# Patient Record
Sex: FEMALE | Race: WHITE | NOT HISPANIC OR LATINO | Employment: OTHER | URBAN - METROPOLITAN AREA
[De-identification: names, ages, dates, MRNs, and addresses within clinical notes are randomized per-mention and may not be internally consistent; named-entity substitution may affect disease eponyms.]

---

## 2017-01-23 ENCOUNTER — OFFICE VISIT (OUTPATIENT)
Dept: URGENT CARE | Facility: URGENT CARE | Age: 72
End: 2017-01-23
Payer: COMMERCIAL

## 2017-01-23 ENCOUNTER — RADIANT APPOINTMENT (OUTPATIENT)
Dept: GENERAL RADIOLOGY | Facility: CLINIC | Age: 72
End: 2017-01-23
Attending: PHYSICIAN ASSISTANT
Payer: COMMERCIAL

## 2017-01-23 VITALS
DIASTOLIC BLOOD PRESSURE: 70 MMHG | SYSTOLIC BLOOD PRESSURE: 130 MMHG | BODY MASS INDEX: 28.07 KG/M2 | WEIGHT: 161 LBS | OXYGEN SATURATION: 97 % | HEART RATE: 70 BPM | TEMPERATURE: 97.5 F

## 2017-01-23 DIAGNOSIS — R05.9 COUGH: ICD-10-CM

## 2017-01-23 DIAGNOSIS — J06.9 ACUTE URI: Primary | ICD-10-CM

## 2017-01-23 PROCEDURE — 99213 OFFICE O/P EST LOW 20 MIN: CPT | Performed by: PHYSICIAN ASSISTANT

## 2017-01-23 PROCEDURE — 71020 XR CHEST 2 VW: CPT

## 2017-01-23 RX ORDER — BENZONATATE 100 MG/1
100 CAPSULE ORAL 3 TIMES DAILY PRN
Qty: 16 CAPSULE | Refills: 0 | Status: SHIPPED | OUTPATIENT
Start: 2017-01-23 | End: 2017-02-28

## 2017-01-23 ASSESSMENT — ENCOUNTER SYMPTOMS
FEVER: 0
DIARRHEA: 0
COUGH: 1
ABDOMINAL PAIN: 0
HEADACHES: 0
CHILLS: 0
SHORTNESS OF BREATH: 0
FOCAL WEAKNESS: 0
NAUSEA: 0
VOMITING: 0

## 2017-01-23 NOTE — NURSING NOTE
"Chief Complaint   Patient presents with     Cough       Initial /70 mmHg  Pulse 70  Temp(Src) 97.5  F (36.4  C) (Oral)  Wt 161 lb (73.029 kg)  SpO2 97% Estimated body mass index is 28.07 kg/(m^2) as calculated from the following:    Height as of 1/15/16: 5' 3.5\" (1.613 m).    Weight as of this encounter: 161 lb (73.029 kg).  BP completed using cuff size: regular    "

## 2017-01-23 NOTE — MR AVS SNAPSHOT
After Visit Summary   1/23/2017    Loly Oliveira    MRN: [id number]           Patient Information     Date Of Birth          1945        Visit Information        Provider Department      1/23/2017 1:45 PM Olinda Galvan PA-C [facility]        Today's Diagnoses     Acute URI    -  1     Cough            Follow-ups after your visit        Follow-up notes from your care team     Return if symptoms worsen or fail to improve.      Who to contact     If you have questions or need follow up information about today's clinic visit or your schedule please contact [facility] directly at [phone].  Normal or non-critical lab and imaging results will be communicated to you by MyChart, letter or phone within 4 business days after the clinic has received the results. If you do not hear from us within 7 days, please contact the clinic through DonorProhart or phone. If you have a critical or abnormal lab result, we will notify you by phone as soon as possible.  Submit refill requests through Veduca or call your pharmacy and they will forward the refill request to us. Please allow 3 business days for your refill to be completed.          Additional Information About Your Visit        MyChart Information     Veduca gives you secure access to your electronic health record. If you see a primary care provider, you can also send messages to your care team and make appointments. If you have questions, please call your primary care clinic.  If you do not have a primary care provider, please call [phone] and they will assist you.        Care EveryWhere ID     This is your Care EveryWhere ID. This could be used by other organizations to access your [facility] medical records  [id number]        Your Vitals Were     Pulse Temperature Pulse Oximetry             70 97.5  F (36.4  C) (Oral) 97%          Blood Pressure from Last 3 Encounters:    01/23/17 130/70   01/15/16 104/60   11/19/15 130/78    Weight from Last 3 Encounters:   01/23/17 161 lb (73.029 kg)   01/15/16 159 lb (72.122 kg)   11/19/15 159 lb (72.122 kg)              We Performed the Following     XR Chest 2 Views          Today's Medication Changes          These changes are accurate as of: 1/23/17  3:07 PM.  If you have any questions, ask your nurse or doctor.               Start taking these medicines.        Dose/Directions    benzonatate 100 MG capsule   Commonly known as:  TESSALON   Used for:  Acute URI   Started by:  Olinda Galvan PA-C        Dose:  100 mg   Take 1 capsule (100 mg) by mouth 3 times daily as needed for cough   Quantity:  16 capsule   Refills:  0            Where to get your medicines      These medications were sent to [address] [address]     Phone:  [phone]    - benzonatate 100 MG capsule             Primary Care Provider Office Phone # Fax #    Pradip Freeman [phone]       [address] [address]        Thank you!     Thank you for choosing [facility]  for your care. Our goal is always to provide you with excellent care. Hearing back from our patients is one way we can continue to improve our services. Please take a few minutes to complete the written survey that you may receive in the mail after your visit with us. Thank you!             Your Updated Medication List - Protect others around you: Learn how to safely use, store and throw away your medicines at www.disposemymeds.org.          This list is accurate as of: 1/23/17  3:07 PM.  Always use your most recent med list.                   Brand Name Dispense Instructions for use    benzonatate 100 MG capsule    TESSALON    16 capsule    Take 1 capsule (100 mg) by mouth 3 times daily as needed for cough       carbidopa-levodopa   MG per tablet    SINEMET    90 tablet    Take 1 tablet by mouth. Daily at bedtime       eflornithine HCl 13.9 % Crea    VANIQA    60 g    Apply to affected skin twice a day       memantine 10 MG tablet    NAMENDA    180 tablet    Take 1 tablet (10 mg) by mouth 2 times daily

## 2017-01-23 NOTE — PROGRESS NOTES
January 23, 2017    HPI: Loly Oliveira is a 71 year old female who complains of moderate cough & nasal congestion onset 1 week ago. Symptoms are constant in duration. She has tried OTC cough suppressants without relief. Denies fever/chills, sore throat, ear pain, hemoptysis, HA, CP, SOB, abd pain, N/V/D, rash, or any other symptoms. Patient denies sick contacts, tobacco use, or hx lung disease.    Past Medical History   Diagnosis Date     Diverticulitis of colon (without mention of hemorrhage)      RESTLESS LEGS SYNDROME      Restless Legs Syndrome     TINNITUS       OSTEOPENIA      Rosacea      Chondromalacia of patella      Esophageal reflux      Bell's palsy      Raynaud disease      Trochanteric bursitis 9/09     right     Dementia without behavioral disturbance, unspecified dementia type 11/19/2015     Past Surgical History   Procedure Laterality Date     C nonspecific procedure  1999     right arthroscopic surgery     C nonspecific procedure  1974     right shoulder decompression     C anter vesicourethropexy,simple  1986     Tonsillectomy & adenoidectomy  1947     C anter vesicourethropexy,simple  1989     bergeron     Hernia repair, umbilical  1989     Hc dilation/curettage diag/ther non ob  1970     Hc repair rotator cuff,chronic       C total abdom hysterectomy  1986     Salpingo oophorectomy,r/l/yara  1986     C nonspecific procedure  Feb 2010     Closed manipulation, left shoulder     Social History   Substance Use Topics     Smoking status: Never Smoker      Smokeless tobacco: Never Used     Alcohol Use: Yes      Comment: 1-2 times per year       Problem list, Medication list, Allergies, and Medical/Social/Surgical histories reviewed in [facility] and updated as appropriate.  HPI      Review of Systems   Constitutional: Negative for fever and chills.   HENT: Positive for congestion.    Respiratory: Positive for cough. Negative for shortness of breath.    Cardiovascular: Negative for chest pain.    Gastrointestinal: Negative for nausea, vomiting, abdominal pain and diarrhea.   Skin: Negative for rash.   Neurological: Negative for focal weakness and headaches.   All other systems reviewed and are negative.        Physical Exam   Constitutional: She is oriented to person, place, and time and well-developed, well-nourished, and in no distress.   HENT:   Head: Normocephalic and atraumatic.   Right Ear: Tympanic membrane, external ear and ear canal normal.   Left Ear: Tympanic membrane, external ear and ear canal normal.   Nose: Rhinorrhea present.   Mouth/Throat: Uvula is midline, oropharynx is clear and moist and mucous membranes are normal.   Cardiovascular: Normal rate, regular rhythm and normal heart sounds.    Pulmonary/Chest: Effort normal and breath sounds normal.   Musculoskeletal: Normal range of motion.   Neurological: She is alert and oriented to person, place, and time. Gait normal.   Skin: Skin is warm and dry.   Nursing note and vitals reviewed.      Vital Signs  /70 mmHg  Pulse 70  Temp(Src) 97.5  F (36.4  C) (Oral)  Wt 161 lb (73.029 kg)  SpO2 97%     Diagnostic Test Results:  CXR - negative    ASSESSMENT/PLAN      ICD-10-CM    1. Acute URI J06.9 benzonatate (TESSALON) 100 MG capsule   2. Cough R05 XR Chest 2 Views    Lungs CTAB, afebrile, NAD. CXR negative. Suspect viral URI, will Rx Tessalon perles.        I have discussed any lab or imaging results, the patient's diagnosis, and my plan of treatment with the patient and/or family. Patient is aware to come back in if with worsening symptoms or if no relief despite treatment plan.  Patient voiced understanding and had no further questions.       Follow Up: Return if symptoms worsen or fail to improve.    KAYLEY Escudero, PAHawkC  [facility]

## 2017-02-28 ENCOUNTER — OFFICE VISIT (OUTPATIENT)
Dept: INTERNAL MEDICINE | Facility: CLINIC | Age: 72
End: 2017-02-28
Payer: COMMERCIAL

## 2017-02-28 VITALS
HEIGHT: 64 IN | HEART RATE: 69 BPM | SYSTOLIC BLOOD PRESSURE: 128 MMHG | BODY MASS INDEX: 27.14 KG/M2 | TEMPERATURE: 97.8 F | OXYGEN SATURATION: 97 % | WEIGHT: 159 LBS | DIASTOLIC BLOOD PRESSURE: 72 MMHG

## 2017-02-28 DIAGNOSIS — F03.90 DEMENTIA WITHOUT BEHAVIORAL DISTURBANCE, UNSPECIFIED DEMENTIA TYPE: Primary | ICD-10-CM

## 2017-02-28 PROCEDURE — 99214 OFFICE O/P EST MOD 30 MIN: CPT | Performed by: INTERNAL MEDICINE

## 2017-02-28 RX ORDER — RIVASTIGMINE TARTRATE 1.5 MG/1
1.5 CAPSULE ORAL 2 TIMES DAILY
Qty: 60 CAPSULE | Refills: 1 | Status: SHIPPED | OUTPATIENT
Start: 2017-02-28 | End: 2017-03-20

## 2017-02-28 NOTE — PATIENT INSTRUCTIONS
Rivastigmine (Exelon) 1.5mg capsule, 1 capsule twice a day for memory  If feeling OK with med after 2 weeks, then increase to 2 capsules twice a day and call nurseline [phone] or email me in Flashtalkingt 4 days after increasing the dose. If tolerated OK, will then change to higher dose capsule. Eventual goal titration to 6mg  Twice a day  Continue Namenda (Memantine)  Walking or other exercise daily as able

## 2017-02-28 NOTE — PROGRESS NOTES
SUBJECTIVE:                                                    Loly Oliveira is a 71 year old female who presents to clinic today for the following health issues:      Dementia follow up  Six Item Cognitive Impairment Test   (6CIT):      What year is it?                               Correct - 0 points    What month is it?                               Correct - 0 points      Give the patient an address to remember with five components:   [place] ( first and last name - 2 components)   [address]  (number and name of street - 2 components)   [place] ( city - 1 component)      About what time is it (within the hour)? Incorrect - 3 points    Count backwards from 20 to 1:   More than one error - 4 points    Say the months of the year in reverse: Correct - 0 points    Repeat the address phrase:   4 errors - 8 points    Total 6CIT Score:      15/28    Interpretation: The 6CIT uses an inverse score and questions are weighted to produce a total out of 28. Scores of 0-7 are considered normal and 8 or more significant.    Advantages The test has high sensitivity without compromising specificity even in mild dementia. It is easy to translate linguistically and culturally.  Disadvantages The main disadvantage is in the scoring and weighting of the test, which is initially confusing, however computer models have simplified this greatly.    Probability Statistics: At the 7/8 cut off: Overall figures sensitivity 90% specificity 100%, in mild dementia sensitivity = 78% , specificity = 100%    Copyright 2000 [facility]. Courtesy of Dr. Eric Medrano    Pt's past medical history, family history, habits, medications and allergies were reviewed with the patient today.  See snap shot for  HCM status. Most recent lab results reviewed with pt. Problem list and histories reviewed & adjusted, as indicated.  Additional history as below:    Denies chest pain, shortness of breath, abdominal  "pain, headache, vision changes or side effects with medications. Memory worsened. Patient seen with her .  does the driving and states the patient does not drive unless an emergency situation and then orally on city streets. Patient is doing the cooking. No episodes of stove  being left on or other safety concerns per  prior workup for reversible causes of dementia normal. Patient denies depression symptoms. Presenting had a good response memory wise to initiation of Aricept but developed diarrhea side effects with the medication so it had to be stopped. Currently on Namenda.      Additional ROS:   Constitutional, HEENT, Cardiovascular, Pulmonary, GI and , Neuro, MSK and Psych review of systems/symptoms are otherwise negative or unchanged from previous, except as noted above.      OBJECTIVE:  /72  Pulse 69  Temp 97.8  F (36.6  C) (Oral)  Ht 5' 3.5\" (1.613 m)  Wt 159 lb (72.1 kg)  SpO2 97%  Breastfeeding? No  BMI 27.72 kg/m2   Estimated body mass index is 27.72 kg/(m^2) as calculated from the following:    Height as of this encounter: 5' 3.5\" (1.613 m).    Weight as of this encounter: 159 lb (72.1 kg).  Eye: PERRL, EOMI  HENT: ear canals and TM's normal and nose and mouth without ulcers or lesions   Neck: no adenopathy. Thyroid normal to palpation. No bruits  Pulm: Lungs clear to auscultation   CV: Regular rates and rhythm  GI: Soft, nontender, Normal active bowel sounds, No hepatosplenomegaly or masses palpable  Ext: Peripheral pulses intact. No edema.  Neuro: Normal strength and tone, sensory exam grossly normal  See 6CIT memory testing above      Assessment/Plan: (See plan discussion below for further details)  1. Dementia without behavioral disturbance, unspecified dementia type  C plan discussion below  - rivastigmine (EXELON) 1.5 MG capsule; Take 1 capsule (1.5 mg) by mouth 2 times daily  Dispense: 60 capsule; Refill: 1    Plan discussion:  Rivastigmine (Exelon) 1.5mg capsule, 1 " capsule twice a day for memory  If feeling OK with med after 2 weeks, then increase to 2 capsules twice a day and call nurseline [phone] or email me in IS[employer] Scienceshart 4 days after increasing the dose. If tolerated OK, will then change to higher dose capsule. Eventual goal titration to 6mg  Twice a day  Continue Namenda (Memantine)  Walking or other exercise daily as able       Pradip Freeman MD  Internal Medicine Department  [facility]

## 2017-02-28 NOTE — MR AVS SNAPSHOT
After Visit Summary   2/28/2017    Loly Oliveira    MRN: [id number]           Patient Information     Date Of Birth          1945        Visit Information        Provider Department      2/28/2017 2:00 PM Pradip Freeman MD [facility]        Today's Diagnoses     Dementia without behavioral disturbance, unspecified dementia type    -  1      Care Instructions    Rivastigmine (Exelon) 1.5mg capsule, 1 capsule twice a day for memory  If feeling OK with med after 2 weeks, then increase to 2 capsules twice a day and call nurseline [phone] or email me in seedchanget 4 days after increasing the dose. If tolerated OK, will then change to higher dose capsule  Continue Namenda (Memantine)        Follow-ups after your visit        Who to contact     If you have questions or need follow up information about today's clinic visit or your schedule please contact [facility] directly at [phone].  Normal or non-critical lab and imaging results will be communicated to you by AesRxhart, letter or phone within 4 business days after the clinic has received the results. If you do not hear from us within 7 days, please contact the clinic through ClairMail or phone. If you have a critical or abnormal lab result, we will notify you by phone as soon as possible.  Submit refill requests through ClairMail or call your pharmacy and they will forward the refill request to us. Please allow 3 business days for your refill to be completed.          Additional Information About Your Visit        AesRxhart Information     ClairMail gives you secure access to your electronic health record. If you see a primary care provider, you can also send messages to your care team and make appointments. If you have questions, please call your primary care clinic.  If you do not have a primary care provider, please call [phone] and they will assist you.        Care EveryWhere ID     This is  "your Care EveryWhere ID. This could be used by other organizations to access your [facility] medical records  [id number]        Your Vitals Were     Pulse Temperature Height Pulse Oximetry Breastfeeding? BMI (Body Mass Index)    69 97.8  F (36.6  C) (Oral) 5' 3.5\" (1.613 m) 97% No 27.72 kg/m2       Blood Pressure from Last 3 Encounters:   02/28/17 128/72   01/23/17 130/70   01/15/16 104/60    Weight from Last 3 Encounters:   02/28/17 159 lb (72.1 kg)   01/23/17 161 lb (73 kg)   01/15/16 159 lb (72.1 kg)              Today, you had the following     No orders found for display         Today's Medication Changes          These changes are accurate as of: 2/28/17  2:05 PM.  If you have any questions, ask your nurse or doctor.               Start taking these medicines.        Dose/Directions    rivastigmine 1.5 MG capsule   Commonly known as:  EXELON   Used for:  Dementia without behavioral disturbance, unspecified dementia type   Started by:  Pradip Freeman MD        Dose:  1.5 mg   Take 1 capsule (1.5 mg) by mouth 2 times daily   Quantity:  60 capsule   Refills:  1         Stop taking these medicines if you haven't already. Please contact your care team if you have questions.     benzonatate 100 MG capsule   Commonly known as:  TESSALON   Stopped by:  Pradip Freeman MD           carbidopa-levodopa  MG per tablet   Commonly known as:  SINEMET   Stopped by:  Pradip Freeman MD           eflornithine HCl 13.9 % Crea   Commonly known as:  VANIQA   Stopped by:  Pradip Freeman MD                Where to get your medicines      These medications were sent to [facility] [address]  [address] [address] [facility] [address]     Phone:  [phone]     rivastigmine 1.5 MG capsule                Primary Care Provider Office Phone # Fax #    Pradip Freeman [phone]       [facility] [address]  [facility] [address]        Thank you!     Thank you for " choosing [facility]  for your care. Our goal is always to provide you with excellent care. Hearing back from our patients is one way we can continue to improve our services. Please take a few minutes to complete the written survey that you may receive in the mail after your visit with us. Thank you!             Your Updated Medication List - Protect others around you: Learn how to safely use, store and throw away your medicines at www.disposemymeds.org.          This list is accurate as of: 2/28/17  2:05 PM.  Always use your most recent med list.                   Brand Name Dispense Instructions for use    memantine 10 MG tablet    NAMENDA    180 tablet    Take 1 tablet (10 mg) by mouth 2 times daily       rivastigmine 1.5 MG capsule    EXELON    60 capsule    Take 1 capsule (1.5 mg) by mouth 2 times daily

## 2017-03-17 ENCOUNTER — MYC MEDICAL ADVICE (OUTPATIENT)
Dept: INTERNAL MEDICINE | Facility: CLINIC | Age: 72
End: 2017-03-17

## 2017-03-20 DIAGNOSIS — F03.90 DEMENTIA WITHOUT BEHAVIORAL DISTURBANCE, UNSPECIFIED DEMENTIA TYPE: ICD-10-CM

## 2017-03-20 NOTE — TELEPHONE ENCOUNTER
Reason for Call:  Medication or medication refill:    Do you use a [facility]?  Name of the pharmacy and phone number for the current request:  [facility] [id number]     Name of the medication requested: Rivastigmine     Other request: n/a    Can we leave a detailed message on this number? YES    Phone number patient can be reached at: Home number on file [phone] (home)    Best Time: anytime     Call taken on 3/20/2017 at 3:31 PM by Aurora Vallecillo

## 2017-03-21 RX ORDER — RIVASTIGMINE TARTRATE 1.5 MG/1
1.5 CAPSULE ORAL 2 TIMES DAILY
Qty: 60 CAPSULE | Refills: 11 | Status: SHIPPED | OUTPATIENT
Start: 2017-03-21 | End: 2017-03-28 | Stop reason: DRUGHIGH

## 2017-03-27 ENCOUNTER — MYC MEDICAL ADVICE (OUTPATIENT)
Dept: INTERNAL MEDICINE | Facility: CLINIC | Age: 72
End: 2017-03-27

## 2017-03-27 DIAGNOSIS — F03.90 DEMENTIA WITHOUT BEHAVIORAL DISTURBANCE, UNSPECIFIED DEMENTIA TYPE: ICD-10-CM

## 2017-03-28 RX ORDER — RIVASTIGMINE TARTRATE 1.5 MG/1
1.5 CAPSULE ORAL 2 TIMES DAILY
Qty: 60 CAPSULE | Refills: 11 | Status: CANCELLED | OUTPATIENT
Start: 2017-03-28

## 2017-03-28 RX ORDER — RIVASTIGMINE TARTRATE 3 MG/1
3 CAPSULE ORAL 2 TIMES DAILY
Qty: 60 CAPSULE | Refills: 1 | Status: SHIPPED | OUTPATIENT
Start: 2017-03-28 | End: 2017-04-27

## 2017-03-28 NOTE — TELEPHONE ENCOUNTER
Reviewed messages from Dr Freeman and from patient.  Will refill at 3 mg BID of the Rivastigmine as it seems that patient was taking much more than she was directed to.

## 2017-04-25 DIAGNOSIS — F03.90 DEMENTIA WITHOUT BEHAVIORAL DISTURBANCE, UNSPECIFIED DEMENTIA TYPE: ICD-10-CM

## 2017-04-26 RX ORDER — RIVASTIGMINE TARTRATE 1.5 MG/1
CAPSULE ORAL
Qty: 60 CAPSULE | Refills: 9 | Status: SHIPPED | OUTPATIENT
Start: 2017-04-26 | End: 2017-04-27

## 2017-04-27 DIAGNOSIS — F03.90 DEMENTIA WITHOUT BEHAVIORAL DISTURBANCE, UNSPECIFIED DEMENTIA TYPE: ICD-10-CM

## 2017-04-27 RX ORDER — RIVASTIGMINE TARTRATE 3 MG/1
3 CAPSULE ORAL 2 TIMES DAILY
Qty: 60 CAPSULE | Refills: 11 | Status: SHIPPED | OUTPATIENT
Start: 2017-04-27 | End: 2017-06-13

## 2017-06-07 ENCOUNTER — OFFICE VISIT (OUTPATIENT)
Dept: URGENT CARE | Facility: URGENT CARE | Age: 72
End: 2017-06-07
Payer: COMMERCIAL

## 2017-06-07 VITALS
SYSTOLIC BLOOD PRESSURE: 120 MMHG | BODY MASS INDEX: 25.81 KG/M2 | HEART RATE: 76 BPM | DIASTOLIC BLOOD PRESSURE: 66 MMHG | WEIGHT: 148 LBS

## 2017-06-07 DIAGNOSIS — S80.11XA HEMATOMA OF RIGHT LOWER EXTREMITY, INITIAL ENCOUNTER: Primary | ICD-10-CM

## 2017-06-07 PROCEDURE — 99213 OFFICE O/P EST LOW 20 MIN: CPT | Performed by: PHYSICIAN ASSISTANT

## 2017-06-07 RX ORDER — CEPHALEXIN 500 MG/1
500 CAPSULE ORAL 3 TIMES DAILY
Qty: 21 CAPSULE | Refills: 0 | Status: SHIPPED | OUTPATIENT
Start: 2017-06-07 | End: 2017-06-13

## 2017-06-07 NOTE — MR AVS SNAPSHOT
After Visit Summary   6/7/2017    Loly Oliveira    MRN: [id number]           Patient Information     Date Of Birth          1945        Visit Information        Provider Department      6/7/2017 6:20 PM Sanjuanita Martínez PA-C [facility]        Today's Diagnoses     Hematoma of right lower extremity, initial encounter    -  1       Follow-ups after your visit        Your next 10 appointments already scheduled     Jun 13, 2017 11:30 AM CDT   SHORT with Pradip Freeman MD   [facility]    [address] [id number] [phone]              Who to contact     If you have questions or need follow up information about today's clinic visit or your schedule please contact [facility] directly at [phone].  Normal or non-critical lab and imaging results will be communicated to you by MyChart, letter or phone within 4 business days after the clinic has received the results. If you do not hear from us within 7 days, please contact the clinic through MyChart or phone. If you have a critical or abnormal lab result, we will notify you by phone as soon as possible.  Submit refill requests through Rage Frameworks or call your pharmacy and they will forward the refill request to us. Please allow 3 business days for your refill to be completed.          Additional Information About Your Visit        MyChart Information     Rage Frameworks gives you secure access to your electronic health record. If you see a primary care provider, you can also send messages to your care team and make appointments. If you have questions, please call your primary care clinic.  If you do not have a primary care provider, please call [phone] and they will assist you.        Care EveryWhere ID     This is your Care EveryWhere ID. This could be used by other organizations to access your  [facility] medical records  [id number]        Your Vitals Were     Pulse BMI (Body Mass Index)                76 25.81 kg/m2           Blood Pressure from Last 3 Encounters:   06/07/17 120/66   02/28/17 128/72   01/23/17 130/70    Weight from Last 3 Encounters:   06/07/17 148 lb (67.1 kg)   02/28/17 159 lb (72.1 kg)   01/23/17 161 lb (73 kg)              Today, you had the following     No orders found for display         Today's Medication Changes          These changes are accurate as of: 6/7/17 11:59 PM.  If you have any questions, ask your nurse or doctor.               Start taking these medicines.        Dose/Directions    cephALEXin 500 MG capsule   Commonly known as:  KEFLEX   Used for:  Hematoma of right lower extremity, initial encounter   Started by:  Sanjuanita Martínez PA-C        Dose:  500 mg   Take 1 capsule (500 mg) by mouth 3 times daily for 7 days   Quantity:  21 capsule   Refills:  0            Where to get your medicines      These medications were sent to [facility] [place] [address]     Phone:  [phone]     cephALEXin 500 MG capsule                Primary Care Provider Office Phone # Fax #    Pradip Freeman [phone]       [address] [address]        Thank you!     Thank you for choosing [facility]  for your care. Our goal is always to provide you with excellent care. Hearing back from our patients is one way we can continue to improve our services. Please take a few minutes to complete the written survey that you may receive in the mail after your visit with us. Thank you!             Your Updated Medication List - Protect others around you: Learn how to safely use, store and throw away your medicines at www.disposemymeds.org.          This list is accurate as of: 6/7/17 11:59 PM.  Always use your most recent med list.                    Brand Name Dispense Instructions for use    cephALEXin 500 MG capsule    KEFLEX    21 capsule    Take 1 capsule (500 mg) by mouth 3 times daily for 7 days       memantine 10 MG tablet    NAMENDA    180 tablet    Take 1 tablet (10 mg) by mouth 2 times daily       rivastigmine 3 MG capsule    EXELON    60 capsule    Take 1 capsule (3 mg) by mouth 2 times daily

## 2017-06-07 NOTE — NURSING NOTE
"Chief Complaint   Patient presents with     Leg Injury     rt leg injury on 5/27,con with bruising       Initial /66 (BP Location: Left arm, Patient Position: Chair, Cuff Size: Adult Regular)  Pulse 76  Wt 148 lb (67.1 kg)  BMI 25.81 kg/m2 Estimated body mass index is 25.81 kg/(m^2) as calculated from the following:    Height as of 2/28/17: 5' 3.5\" (1.613 m).    Weight as of this encounter: 148 lb (67.1 kg).  Medication Reconciliation: complete   Neil FREIRE    "

## 2017-06-09 NOTE — PROGRESS NOTES
SUBJECTIVE:  Chief Complaint   Patient presents with     Leg Injury     rt leg injury on 5/27,con with bruising     Loly Oliveira is a 71 year old female presents with a chief complaint of right lower leg pain and bruising since injuring leg on 5/27.  Her right lower leg hit a bleacher and bruised, with small amount of bleeding.  Bruising extended into the foot.  Foot bruising has resolved, but area of trauma still has large swelling which is now soft and seems more tender.      No fevers.      She is here with her  this evening.          Past Medical History:   Diagnosis Date     Bell's palsy      Chondromalacia of patella      Dementia without behavioral disturbance, unspecified dementia type 11/19/2015     Diverticulitis of colon (without mention of hemorrhage)      Esophageal reflux      OSTEOPENIA      Raynaud disease      RESTLESS LEGS SYNDROME     Restless Legs Syndrome     Rosacea      TINNITUS       Trochanteric bursitis 9/09    right     Patient Active Problem List   Diagnosis     Generalized osteoarthrosis, unspecified site     Disorder of bone and cartilage     RESTLESS LEGS SYNDROME     Rosacea     Esophageal reflux     Postmenopausal atrophic vaginitis     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     CARDIOVASCULAR SCREENING; LDL GOAL LESS THAN 160     Advanced directives, counseling/discussion     Dementia without behavioral disturbance, unspecified dementia type     Social History   Substance Use Topics     Smoking status: Never Smoker     Smokeless tobacco: Never Used     Alcohol use Yes      Comment: 1-2 times per year       ROS:  CONSTITUTIONAL:NEGATIVE for fever, chills, change in weight  INTEGUMENTARY/SKIN: as per HPI  MUSCULOSKELETAL: as per HPI    EXAM:   /66 (BP Location: Left arm, Patient Position: Chair, Cuff Size: Adult Regular)  Pulse 76  Wt 148 lb (67.1 kg)  BMI 25.81 kg/m2  Gen: healthy,alert,no distress  Extremity: right lower leg: no bony tenderness.  Hematoma  distal aspect 3cm in diameter with slightly soft center.  Subtle warmth to touch.    There is not compromise to the distal circulation.  Pulses are +2 and CRT is brisk  SKIN: scabbed lesion in center of hematoma    X-RAY was not done.    (S80.11XA) Hematoma of right lower extremity, initial encounter  (primary encounter diagnosis)  Comment: concern for subtle infection  Plan: cephALEXin (KEFLEX) 500 MG capsule        Warm compress to area QID.    Ibuprofen prn. Elevate frequently.      F/U with PCP should symptoms persist or worsen.  Patient and her  express understanding and agreement with the assessment and plan as above.

## 2017-06-13 ENCOUNTER — OFFICE VISIT (OUTPATIENT)
Dept: INTERNAL MEDICINE | Facility: CLINIC | Age: 72
End: 2017-06-13
Payer: COMMERCIAL

## 2017-06-13 VITALS
HEART RATE: 62 BPM | OXYGEN SATURATION: 98 % | TEMPERATURE: 98.7 F | WEIGHT: 147 LBS | SYSTOLIC BLOOD PRESSURE: 120 MMHG | BODY MASS INDEX: 25.63 KG/M2 | DIASTOLIC BLOOD PRESSURE: 64 MMHG

## 2017-06-13 DIAGNOSIS — F03.90 DEMENTIA WITHOUT BEHAVIORAL DISTURBANCE, UNSPECIFIED DEMENTIA TYPE: ICD-10-CM

## 2017-06-13 DIAGNOSIS — S80.11XD TRAUMATIC ECCHYMOSIS OF RIGHT LOWER LEG, SUBSEQUENT ENCOUNTER: Primary | ICD-10-CM

## 2017-06-13 PROCEDURE — 99214 OFFICE O/P EST MOD 30 MIN: CPT | Performed by: INTERNAL MEDICINE

## 2017-06-13 RX ORDER — MEMANTINE HYDROCHLORIDE 10 MG/1
10 TABLET ORAL 2 TIMES DAILY
Qty: 180 TABLET | Refills: 3 | Status: SHIPPED | OUTPATIENT
Start: 2017-06-13 | End: 2017-11-27

## 2017-06-13 RX ORDER — MEMANTINE HYDROCHLORIDE 5 MG/1
5 TABLET ORAL 2 TIMES DAILY
Qty: 60 TABLET | Refills: 0 | Status: SHIPPED | OUTPATIENT
Start: 2017-06-13 | End: 2017-07-13

## 2017-06-13 RX ORDER — RIVASTIGMINE TARTRATE 3 MG/1
3 CAPSULE ORAL 2 TIMES DAILY
Qty: 180 CAPSULE | Refills: 3 | Status: SHIPPED | OUTPATIENT
Start: 2017-06-13 | End: 2017-11-27

## 2017-06-13 RX ORDER — MEMANTINE HYDROCHLORIDE 10 MG/1
10 TABLET ORAL 2 TIMES DAILY
Qty: 180 TABLET | Refills: 3 | Status: CANCELLED | OUTPATIENT
Start: 2017-06-13

## 2017-06-13 NOTE — PATIENT INSTRUCTIONS
Rivastigmine 3mg tab, 1 tab twice a day as you are doing now  Memantine (Namenda) 5mg tab, one tab daily in PM for 2 weeks, then one tab (5mg) twice a day for 2 weeks, then one tab (5mg) in AM for 2 weeks while also taking one tab (10mg)   in PM, then one tab (10mg)  twice a day longterm  See me in  December for follow-up, earlier as needed  Continue current cares for right leg. Ice as needed if sore

## 2017-06-13 NOTE — MR AVS SNAPSHOT
After Visit Summary   6/13/2017    Loly Oliveira    MRN: [id number]           Patient Information     Date Of Birth          1945        Visit Information        Provider Department      6/13/2017 11:30 AM Pradip Freeman MD [facility]        Today's Diagnoses     Dementia without behavioral disturbance, unspecified dementia type          Care Instructions    Rivastigmine 3mg tab, 1 tab twice a day as you are doing now  Memantine (Namenda) 5mg tab, one tab daily in PM for 2 weeks, then one tab (5mg) twice a day for 2 weeks, then one tab (5mg) in AM for 2 weeks while also taking one tab (10mg)   in PM, then one tab (10mg)  twice a day longterm  See me in  December for follow-up, earlier as needed          Follow-ups after your visit        Who to contact     If you have questions or need follow up information about today's clinic visit or your schedule please contact [facility] directly at [phone].  Normal or non-critical lab and imaging results will be communicated to you by [employer], letter or phone within 4 business days after the clinic has received the results. If you do not hear from us within 7 days, please contact the clinic through [employer] or phone. If you have a critical or abnormal lab result, we will notify you by phone as soon as possible.  Submit refill requests through [employer] or call your pharmacy and they will forward the refill request to us. Please allow 3 business days for your refill to be completed.          Additional Information About Your Visit        MyChart Information     [employer] gives you secure access to your electronic health record. If you see a primary care provider, you can also send messages to your care team and make appointments. If you have questions, please call your primary care clinic.  If you do not have a primary care provider, please call [phone] and they will assist you.        Care EveryWhere  ID     This is your Care EveryWhere ID. This could be used by other organizations to access your [facility] medical records  [id number]        Your Vitals Were     Pulse Temperature Pulse Oximetry BMI (Body Mass Index)          62 98.7  F (37.1  C) (Oral) 98% 25.63 kg/m2         Blood Pressure from Last 3 Encounters:   06/13/17 120/64   06/07/17 120/66   02/28/17 128/72    Weight from Last 3 Encounters:   06/13/17 147 lb (66.7 kg)   06/07/17 148 lb (67.1 kg)   02/28/17 159 lb (72.1 kg)              Today, you had the following     No orders found for display         Today's Medication Changes          These changes are accurate as of: 6/13/17 12:14 PM.  If you have any questions, ask your nurse or doctor.               These medicines have changed or have updated prescriptions.        Dose/Directions    * memantine 5 MG tablet   Commonly known as:  NAMENDA   This may have changed:  You were already taking a medication with the same name, and this prescription was added. Make sure you understand how and when to take each.   Used for:  Dementia without behavioral disturbance, unspecified dementia type   Changed by:  Pradip Freeman MD        Dose:  5 mg   Take 1 tablet (5 mg) by mouth 2 times daily   Quantity:  60 tablet   Refills:  0       * memantine 10 MG tablet   Commonly known as:  NAMENDA   This may have changed:  Another medication with the same name was added. Make sure you understand how and when to take each.   Used for:  Dementia without behavioral disturbance, unspecified dementia type   Changed by:  Pradip Freeman MD        Dose:  10 mg   Take 1 tablet (10 mg) by mouth 2 times daily   Quantity:  180 tablet   Refills:  3       * Notice:  This list has 2 medication(s) that are the same as other medications prescribed for you. Read the directions carefully, and ask your doctor or other care provider to review them with you.         Where to get your medicines      These medications were sent to [facility] [place], [place] - [address]  [address] [facility] [address]     Phone:  [phone]     memantine 10 MG tablet    memantine 5 MG tablet    rivastigmine 3 MG capsule                Primary Care Provider Office Phone # Fax #    Pradip Freeman [phone]       [facility] [address]  [facility] [address]        Thank you!     Thank you for choosing [facility]  for your care. Our goal is always to provide you with excellent care. Hearing back from our patients is one way we can continue to improve our services. Please take a few minutes to complete the written survey that you may receive in the mail after your visit with us. Thank you!             Your Updated Medication List - Protect others around you: Learn how to safely use, store and throw away your medicines at www.disposemymeds.org.          This list is accurate as of: 6/13/17 12:14 PM.  Always use your most recent med list.                   Brand Name Dispense Instructions for use    * memantine 5 MG tablet    NAMENDA    60 tablet    Take 1 tablet (5 mg) by mouth 2 times daily       * memantine 10 MG tablet    NAMENDA    180 tablet    Take 1 tablet (10 mg) by mouth 2 times daily       rivastigmine 3 MG capsule    EXELON    180 capsule    Take 1 capsule (3 mg) by mouth 2 times daily       * Notice:  This list has 2 medication(s) that are the same as other medications prescribed for you. Read the directions carefully, and ask your doctor or other care provider to review them with you.

## 2017-06-13 NOTE — PROGRESS NOTES
SUBJECTIVE:                                                    Loly Oliveira is a 71 year old female who presents to clinic today for the following health issues:      Medication Recheck     Amount of exercise or physical activity: 4-5 days/week for an average of 15-30 minutes    Problems taking medications regularly: No    Medication side effects: none    Diet: regular (no restrictions)         Pt's past medical history, family history, habits, medications and allergies were reviewed with the patient today.  See snap shot for  HCM status. Most recent lab results reviewed with pt. Problem list and histories reviewed & adjusted, as indicated.  Additional history as below:     Six Item Cognitive Impairment Test   (6CIT):      What year is it?                               Incorrect - 4 points    What month is it?                               Correct - 0 points      Give the patient an address to remember with five components:   [place] ( first and last name - 2 components)   [address]  (number and name of street - 2 components)   [place] ( city - 1 component)      About what time is it (within the hour)? Correct - 0 points    Count backwards from 20 to 1:   One error - 2 points    Say the months of the year in reverse: More than one error - 4 points    Repeat the address phrase:   3 errors - 6 points    Total 6CIT Score:      18/28    Interpretation: The 6CIT uses an inverse score and questions are weighted to produce a total out of 28. Scores of 0-7 are considered normal and 8 or more significant.    Advantages The test has high sensitivity without compromising specificity even in mild dementia. It is easy to translate linguistically and culturally.  Disadvantages The main disadvantage is in the scoring and weighting of the test, which is initially confusing, however computer models have simplified this greatly.    Probability Statistics: At the 7/8 cut off: Overall figures sensitivity 90%  "specificity 100%, in mild dementia sensitivity = 78% , specificity = 100%    Copyright 2000 [facility], [facility]. Courtesy of Dr. Eric Medrano    Additional ROS:   Constitutional, HEENT, Cardiovascular, Pulmonary, GI and , Neuro, MSK and Psych review of systems/symptoms are otherwise negative or unchanged from previous, except as noted above.       efficiency with her  today. When had started using Exelon capsules, patient inappropriately stopped using Namenda. Was not having side effects with that medication.  Patient feels her memory issues are stable.  describes more troubles with executive function issues. Patient not driving. Patient and  both help with cooking together. No issues of food being burned, etc. See 6CIT score above that is worsened some. Seen in urgent care recently after hitting LE on metal grandstand when tripped. Hematoma nearly resolved now anterior tibial area right. Denies pain there    OBJECTIVE:  /64  Pulse 62  Temp 98.7  F (37.1  C) (Oral)  Wt 147 lb (66.7 kg)  SpO2 98%  BMI 25.63 kg/m2   Estimated body mass index is 25.63 kg/(m^2) as calculated from the following:    Height as of 2/28/17: 5' 3.5\" (1.613 m).    Weight as of this encounter: 147 lb (66.7 kg).  Eye: PERRL, EOMI  HENT: ear canals and TM's normal and nose and mouth without ulcers or lesions   Neck: no adenopathy. Thyroid normal to palpation. No bruits  Pulm: Lungs clear to auscultation   CV: Regular rates and rhythm  GI: Soft, nontender, Normal active bowel sounds, No hepatosplenomegaly or masses palpable  Ext: Peripheral pulses intact. No edema. Small ecchymosis right ant tib area without warmth/erythema with small superficial scab on top  Neuro: Normal strength and tone, sensory exam grossly normal.See memort testing above    Assessment/Plan: (See plan discussion below for further details)  1. Dementia without behavioral disturbance, unspecified dementia type  See plan " discussion below. Worsened currently off of Namenda. /pt deny current home safety issues  - rivastigmine (EXELON) 3 MG capsule; Take 1 capsule (3 mg) by mouth 2 times daily  Dispense: 180 capsule; Refill: 3  - memantine (NAMENDA) 5 MG tablet; Take 1 tablet (5 mg) by mouth 2 times daily  Dispense: 60 tablet; Refill: 0  - memantine (NAMENDA) 10 MG tablet; Take 1 tablet (10 mg) by mouth 2 times daily  Dispense: 180 tablet; Refill: 3    2. Traumatic ecchymosis of right lower leg, subsequent encounter  Healing as expected. Continue current conservative cares    Plan discussion:  Rivastigmine 3mg tab, 1 tab twice a day as you are doing now  Memantine (Namenda) 5mg tab, one tab daily in PM for 2 weeks, then one tab (5mg) twice a day for 2 weeks, then one tab (5mg) in AM for 2 weeks while also taking one tab (10mg)   in PM, then one tab (10mg)  twice a day longterm  See me in  December for follow-up, earlier as needed  Continue current cares for right leg. Ice as needed if sore       Pradip Freeman MD  Internal Medicine Department  [facility]

## 2017-06-13 NOTE — NURSING NOTE
"Chief Complaint   Patient presents with     Recheck Medication       Initial /64  Pulse 62  Temp 98.7  F (37.1  C) (Oral)  Wt 147 lb (66.7 kg)  SpO2 98%  BMI 25.63 kg/m2 Estimated body mass index is 25.63 kg/(m^2) as calculated from the following:    Height as of 2/28/17: 5' 3.5\" (1.613 m).    Weight as of this encounter: 147 lb (66.7 kg).  Medication Reconciliation: complete  "

## 2017-09-18 DIAGNOSIS — Z12.31 VISIT FOR SCREENING MAMMOGRAM: ICD-10-CM

## 2017-09-18 PROCEDURE — G0202 SCR MAMMO BI INCL CAD: HCPCS | Mod: TC

## 2017-11-27 ENCOUNTER — OFFICE VISIT (OUTPATIENT)
Dept: INTERNAL MEDICINE | Facility: CLINIC | Age: 72
End: 2017-11-27
Payer: COMMERCIAL

## 2017-11-27 ENCOUNTER — RADIANT APPOINTMENT (OUTPATIENT)
Dept: GENERAL RADIOLOGY | Facility: CLINIC | Age: 72
End: 2017-11-27
Attending: INTERNAL MEDICINE
Payer: COMMERCIAL

## 2017-11-27 VITALS
OXYGEN SATURATION: 96 % | WEIGHT: 141 LBS | TEMPERATURE: 98 F | BODY MASS INDEX: 24.59 KG/M2 | HEART RATE: 80 BPM | SYSTOLIC BLOOD PRESSURE: 116 MMHG | DIASTOLIC BLOOD PRESSURE: 64 MMHG

## 2017-11-27 DIAGNOSIS — G89.29 CHRONIC BILATERAL THORACIC BACK PAIN: ICD-10-CM

## 2017-11-27 DIAGNOSIS — M54.6 CHRONIC BILATERAL THORACIC BACK PAIN: ICD-10-CM

## 2017-11-27 DIAGNOSIS — F03.90 DEMENTIA WITHOUT BEHAVIORAL DISTURBANCE, UNSPECIFIED DEMENTIA TYPE: ICD-10-CM

## 2017-11-27 DIAGNOSIS — G89.29 CHRONIC BILATERAL THORACIC BACK PAIN: Primary | ICD-10-CM

## 2017-11-27 DIAGNOSIS — M54.6 CHRONIC BILATERAL THORACIC BACK PAIN: Primary | ICD-10-CM

## 2017-11-27 PROCEDURE — 72070 X-RAY EXAM THORAC SPINE 2VWS: CPT

## 2017-11-27 PROCEDURE — 99213 OFFICE O/P EST LOW 20 MIN: CPT | Performed by: INTERNAL MEDICINE

## 2017-11-27 RX ORDER — MEMANTINE HYDROCHLORIDE 10 MG/1
10 TABLET ORAL 2 TIMES DAILY
Qty: 180 TABLET | Refills: 3 | Status: SHIPPED | OUTPATIENT
Start: 2017-11-27 | End: 2018-12-04

## 2017-11-27 RX ORDER — RIVASTIGMINE TARTRATE 3 MG/1
3 CAPSULE ORAL 2 TIMES DAILY
Qty: 180 CAPSULE | Refills: 3 | Status: SHIPPED | OUTPATIENT
Start: 2017-11-27 | End: 2018-12-04

## 2017-11-27 NOTE — PROGRESS NOTES
SUBJECTIVE:   Loly Oliveira is a 71 year old female who presents to clinic today for the following health issues:  Chief Complaint   Patient presents with     Back Pain     Follow Up For     Memantine and Exelon     Six Item Cognitive Impairment Test   (6CIT):      What year is it?                               Correct - 0 points    What month is it?                               Correct - 0 points      Give the patient an address to remember with five components:   [place] ( first and last name - 2 components)   [address] [facility]  (number and name of street - 2 components)   [place] ( city - 1 component)      About what time is it (within the hour)? Correct - 0 points    Count backwards from 20 to 1:   More than one error - 4 points    Say the months of the year in reverse: More than one error - 4 points    Repeat the address phrase:   All wrong - 10 points    Total 6CIT Score:      18/28    Interpretation: The 6CIT uses an inverse score and questions are weighted to produce a total out of 28. Scores of 0-7 are considered normal and 8 or more significant.    Advantages The test has high sensitivity without compromising specificity even in mild dementia. It is easy to translate linguistically and culturally.  Disadvantages The main disadvantage is in the scoring and weighting of the test, which is initially confusing, however computer models have simplified this greatly.    Probability Statistics: At the 7/8 cut off: Overall figures sensitivity 90% specificity 100%, in mild dementia sensitivity = 78% , specificity = 100%    Copyright 2000 [facility], [facility], . Courtesy of Dr. Eric Medrano    Medication Followup of Memantine and Exelon    Taking Medication as prescribed: yes    Side Effects:  None    Medication Helping Symptoms: Somewhat      Back Pain       Duration: x 2 months        Specific cause: none    Description:   Location of pain: middle of back bilateral  Character of  "pain: sore  and intermittent  Pain radiation:none  New numbness or weakness in legs, not attributed to pain:  no     Intensity: Currently 0/10    History:   Pain interferes with job: No  History of back problems: no prior back problems  Any previous MRI or X-rays: None  Sees a specialist for back pain:  No  Therapies tried without relief: massage    Alleviating factors:   Improved by: massage      Precipitating factors:  Worsened by: Nothing    Functional and Psychosocial Screen (Risa STarT Back):      Not performed today          Accompanying Signs & Symptoms:  Risk of Fracture:  Age >64  Risk of Cauda Equina:  None  Risk of Infection:  None  Risk of Cancer:  None  Risk of Ankylosing Spondylitis:  Onset at age <35, male, AND morning back stiffness. no     Pt's past medical history, family history, habits, medications and allergies were reviewed with the patient today.  See snap shot for  HCM status. Most recent lab results reviewed with pt. Problem list and histories reviewed & adjusted, as indicated.  Additional history as below:    Back pain bra-line level. better with back rub and then will go away for a few days. No pain today. No radiation. Sx bilateral. No change with exertion. No cough or shortness of breath. No rash.   Memory testing as above. Seen with . Pt not driving. Still cooking. HAs not left the stove on. Able to do ADLs OK without assistance     Additional ROS:   Constitutional, HEENT, Cardiovascular, Pulmonary, GI and , Neuro, MSK and Psych review of systems/symptoms are otherwise negative or unchanged from previous, except as noted above.      OBJECTIVE:  /64  Pulse 80  Temp 98  F (36.7  C) (Oral)  Wt 141 lb (64 kg)  SpO2 96%  BMI 24.59 kg/m2   Estimated body mass index is 24.59 kg/(m^2) as calculated from the following:    Height as of 2/28/17: 5' 3.5\" (1.613 m).    Weight as of this encounter: 141 lb (64 kg).  Pulm: Lungs clear to auscultation   CV: Regular rates and " rhythm  GI: Soft, nontender, Normal active bowel sounds, No hepatosplenomegaly or masses palpable  Ext: Peripheral pulses intact. No edema.  Neuro: Normal strength and tone, sensory exam grossly normal. No hypersensitivity to LTS back  MSK: No current tenderness to palpation bilateral thoracic spine to palpation despite subjective sx   Skin: No rash on back    Assessment/Plan: (See plan discussion below for further details)  1. Dementia without behavioral disturbance, unspecified dementia type  6 CIT score unchanged form last visit. Continue combo med for now  - rivastigmine (EXELON) 3 MG capsule; Take 1 capsule (3 mg) by mouth 2 times daily  Dispense: 180 capsule; Refill: 3  - memantine (NAMENDA) 10 MG tablet; Take 1 tablet (10 mg) by mouth 2 times daily  Dispense: 180 tablet; Refill: 3    2. Chronic bilateral thoracic back pain   Exam negative currently. Given chronic recurrent sx, will check Xray to r/o curvature, old fx, etc  - XR Thoracic Spine 2 Views; Future    Plan discussion:  Continue current meds  Prescriptions refilled.    Pt declines flu shot  Xray thoracic spine - results reviewed with pt and  today - Normal  Stretching exercises for back discussed with pt. Tylenol prn  See me in 6 months, earlier as needed       Pradip Freeman MD  Internal Medicine Department  [facility]

## 2017-11-27 NOTE — MR AVS SNAPSHOT
After Visit Summary   11/27/2017    Loly Oliveira    MRN: [id number]           Patient Information     Date Of Birth          1945        Visit Information        Provider Department      11/27/2017 1:00 PM Pradip Freeman MD [facility]        Today's Diagnoses     Chronic bilateral thoracic back pain    -  1    Dementia without behavioral disturbance, unspecified dementia type          Care Instructions    Plan discussion:  Continue current meds  Prescriptions refilled.    Pt declines flu shot  Xray thoracic spine -   Stretching exercises for back discussed with pt. Tylenol as needed  See me in 6 months, earlier as needed          Follow-ups after your visit        Who to contact     If you have questions or need follow up information about today's clinic visit or your schedule please contact [facility] directly at [phone].  Normal or non-critical lab and imaging results will be communicated to you by [employer], letter or phone within 4 business days after the clinic has received the results. If you do not hear from us within 7 days, please contact the clinic through [employer] or phone. If you have a critical or abnormal lab result, we will notify you by phone as soon as possible.  Submit refill requests through [employer] or call your pharmacy and they will forward the refill request to us. Please allow 3 business days for your refill to be completed.          Additional Information About Your Visit        MyChart Information     [employer] gives you secure access to your electronic health record. If you see a primary care provider, you can also send messages to your care team and make appointments. If you have questions, please call your primary care clinic.  If you do not have a primary care provider, please call [phone] and they will assist you.        Care EveryWhere ID     This is your Care EveryWhere ID. This could be used by other  organizations to access your [place] medical records  [id number]        Your Vitals Were     Pulse Temperature Pulse Oximetry BMI (Body Mass Index)          80 98  F (36.7  C) (Oral) 96% 24.59 kg/m2         Blood Pressure from Last 3 Encounters:   11/27/17 116/64   06/13/17 120/64   06/07/17 120/66    Weight from Last 3 Encounters:   11/27/17 141 lb (64 kg)   06/13/17 147 lb (66.7 kg)   06/07/17 148 lb (67.1 kg)                 Where to get your medicines      These medications were sent to [facility] [address]  [address] [address]     Phone:  [phone]     memantine 10 MG tablet    rivastigmine 3 MG capsule          Primary Care Provider Office Phone # Fax #    Pradip Freeman [phone]       [address] [address]        Equal Access to Services     ROGER THOMAS : Hadii aad ku hadasho Soomaali, waaxda luqadaha, qaybta kaalmada adeegyada, waxay idiin hayдмитрийn catherine esparza . So [facility] [phone].    ATENCIÓN: Si habla español, tiene a almaraz disposición servicios gratuitos de asistencia lingüística. Llame al [phone].    We comply with applicable federal civil rights laws and [place] laws. We do not discriminate on the basis of race, color, national origin, age, disability, sex, sexual orientation, or gender identity.            Thank you!     Thank you for choosing [facility]  for your care. Our goal is always to provide you with excellent care. Hearing back from our patients is one way we can continue to improve our services. Please take a few minutes to complete the written survey that you may receive in the mail after your visit with us. Thank you!             Your Updated Medication List - Protect others around you: Learn how to safely use, store and throw away your medicines at www.disposemymeds.org.          This list is accurate as of: 11/27/17 11:59 PM.  Always use your most  recent med list.                   Brand Name Dispense Instructions for use Diagnosis    memantine 10 MG tablet    NAMENDA    180 tablet    Take 1 tablet (10 mg) by mouth 2 times daily    Dementia without behavioral disturbance, unspecified dementia type       rivastigmine 3 MG capsule    EXELON    180 capsule    Take 1 capsule (3 mg) by mouth 2 times daily    Dementia without behavioral disturbance, unspecified dementia type

## 2017-11-27 NOTE — NURSING NOTE
"Chief Complaint   Patient presents with     Back Pain     Follow Up For     Memantine and Exelon       Initial /64  Pulse 80  Temp 98  F (36.7  C) (Oral)  Wt 141 lb (64 kg)  SpO2 96%  BMI 24.59 kg/m2 Estimated body mass index is 24.59 kg/(m^2) as calculated from the following:    Height as of 2/28/17: 5' 3.5\" (1.613 m).    Weight as of this encounter: 141 lb (64 kg).  Medication Reconciliation: complete  "

## 2017-11-30 NOTE — PATIENT INSTRUCTIONS
Plan discussion:  Continue current meds  Prescriptions refilled.    Pt declines flu shot  Xray thoracic spine -   Stretching exercises for back discussed with pt. Tylenol as needed  See me in 6 months, earlier as needed

## 2018-09-19 ENCOUNTER — RADIANT APPOINTMENT (OUTPATIENT)
Dept: MAMMOGRAPHY | Facility: CLINIC | Age: 73
End: 2018-09-19
Attending: INTERNAL MEDICINE
Payer: COMMERCIAL

## 2018-09-19 DIAGNOSIS — Z12.31 VISIT FOR SCREENING MAMMOGRAM: ICD-10-CM

## 2018-09-19 PROCEDURE — 77067 SCR MAMMO BI INCL CAD: CPT | Mod: TC

## 2018-12-04 ENCOUNTER — OFFICE VISIT (OUTPATIENT)
Dept: INTERNAL MEDICINE | Facility: CLINIC | Age: 73
End: 2018-12-04
Payer: COMMERCIAL

## 2018-12-04 VITALS
RESPIRATION RATE: 16 BRPM | OXYGEN SATURATION: 100 % | SYSTOLIC BLOOD PRESSURE: 116 MMHG | TEMPERATURE: 97.1 F | HEART RATE: 66 BPM | DIASTOLIC BLOOD PRESSURE: 57 MMHG | WEIGHT: 142 LBS | BODY MASS INDEX: 24.76 KG/M2

## 2018-12-04 DIAGNOSIS — F03.90 DEMENTIA WITHOUT BEHAVIORAL DISTURBANCE, UNSPECIFIED DEMENTIA TYPE: ICD-10-CM

## 2018-12-04 DIAGNOSIS — Z23 NEED FOR PROPHYLACTIC VACCINATION AND INOCULATION AGAINST INFLUENZA: ICD-10-CM

## 2018-12-04 PROCEDURE — 90662 IIV NO PRSV INCREASED AG IM: CPT | Performed by: INTERNAL MEDICINE

## 2018-12-04 PROCEDURE — G0008 ADMIN INFLUENZA VIRUS VAC: HCPCS | Performed by: INTERNAL MEDICINE

## 2018-12-04 PROCEDURE — 99213 OFFICE O/P EST LOW 20 MIN: CPT | Mod: 25 | Performed by: INTERNAL MEDICINE

## 2018-12-04 RX ORDER — RIVASTIGMINE TARTRATE 3 MG/1
3 CAPSULE ORAL 2 TIMES DAILY
Qty: 180 CAPSULE | Refills: 3 | Status: SHIPPED | OUTPATIENT
Start: 2018-12-04 | End: 2019-06-11

## 2018-12-04 RX ORDER — MEMANTINE HYDROCHLORIDE 10 MG/1
10 TABLET ORAL 2 TIMES DAILY
Qty: 180 TABLET | Refills: 3 | Status: SHIPPED | OUTPATIENT
Start: 2018-12-04 | End: 2019-06-11

## 2018-12-04 NOTE — PATIENT INSTRUCTIONS
Continue current meds  Prescriptions refilled.    Follow up in 6 months (June 2019). Earlier as needed  Vaccinations: Influenza   Check with insurance or speak with your pharmacist re: Shingrix vaccine coverage for shingles prevention.  This is a 2 shot series done 2-6 months apart  Walking exercise for increased blood circulation to the brain
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
## 2018-12-04 NOTE — MR AVS SNAPSHOT
After Visit Summary   12/4/2018    Loly Oliveira    MRN: [id number]           Patient Information     Date Of Birth          1945        Visit Information        Provider Department      12/4/2018 10:30 AM Pradip Freeman MD [facility]        Today's Diagnoses     Need for prophylactic vaccination and inoculation against influenza    -  1    Dementia without behavioral disturbance, unspecified dementia type          Care Instructions    Continue current meds  Prescriptions refilled.    Follow up in 6 months (June 2019). Earlier as needed  Vaccinations: Influenza   Check with insurance or speak with your pharmacist re: Shingrix vaccine coverage for shingles prevention.  This is a 2 shot series done 2-6 months apart  Walking exercise for increased blood circulation to the brain          Follow-ups after your visit        Who to contact     If you have questions or need follow up information about today's clinic visit or your schedule please contact [facility] directly at [phone].  Normal or non-critical lab and imaging results will be communicated to you by [employer] Project[employer], letter or phone within 4 business days after the clinic has received the results. If you do not hear from us within 7 days, please contact the clinic through [employer] Project[employer] or phone. If you have a critical or abnormal lab result, we will notify you by phone as soon as possible.  Submit refill requests through [employer] or call your pharmacy and they will forward the refill request to us. Please allow 3 business days for your refill to be completed.          Additional Information About Your Visit        MyChart Information     [employer] Access gives you secure access to your electronic health record. If you see a primary care provider, you can also send messages to your care team and make appointments. If you have questions, please call your primary care clinic.  If you do not have a primary care  provider, please call [phone] and they will assist you.        Care EveryWhere ID     This is your Care EveryWhere ID. This could be used by other organizations to access your [facility] medical records  [id number]        Your Vitals Were     Pulse Temperature Respirations Pulse Oximetry BMI (Body Mass Index)       66 97.1  F (36.2  C) (Oral) 16 100% 24.76 kg/m2        Blood Pressure from Last 3 Encounters:   12/04/18 116/57   11/27/17 116/64   06/13/17 120/64    Weight from Last 3 Encounters:   12/04/18 142 lb (64.4 kg)   11/27/17 141 lb (64 kg)   06/13/17 147 lb (66.7 kg)              We Performed the Following     ADMIN INFLUENZA (For MEDICARE Patients ONLY) []     FLU VACCINE, INCREASED ANTIGEN, PRESV FREE, AGE 65+ [15381]          Where to get your medicines      These medications were sent to [facility] [address] [address]     Phone:  [phone]     memantine 10 MG tablet    rivastigmine 3 MG capsule          Primary Care Provider Office Phone # Fax #    Pradip Freeman [phone]       [address] [place] [address]        Equal Access to Services     ROGER THOMAS AH: Hadii vira ku hadasho Soomaali, waaxda luqadaha, qaybta kaalmada adeegyada, ismael rayo haypearl arboleda. So [facility] [phone].    ATENCIÓN: Si habla español, tiene a almaraz disposición servicios gratuitos de asistencia lingüística. Llame al [phone].    We comply with applicable federal civil rights laws and [place] laws. We do not discriminate on the basis of race, color, national origin, age, disability, sex, sexual orientation, or gender identity.            Thank you!     Thank you for choosing [facility]  for your care. Our goal is always to provide you with excellent care. Hearing back from our patients is one way we can continue to improve our services. Please take a few minutes to complete the  written survey that you may receive in the mail after your visit with us. Thank you!             Your Updated Medication List - Protect others around you: Learn how to safely use, store and throw away your medicines at www.disposemymeds.org.          This list is accurate as of 12/4/18 10:44 AM.  Always use your most recent med list.                   Brand Name Dispense Instructions for use Diagnosis    memantine 10 MG tablet    NAMENDA    180 tablet    Take 1 tablet (10 mg) by mouth 2 times daily    Dementia without behavioral disturbance, unspecified dementia type       rivastigmine 3 MG capsule    EXELON    180 capsule    Take 1 capsule (3 mg) by mouth 2 times daily    Dementia without behavioral disturbance, unspecified dementia type

## 2019-04-08 ENCOUNTER — OFFICE VISIT (OUTPATIENT)
Dept: PODIATRY | Facility: CLINIC | Age: 74
End: 2019-04-08

## 2019-04-08 VITALS
BODY MASS INDEX: 22.82 KG/M2 | DIASTOLIC BLOOD PRESSURE: 62 MMHG | SYSTOLIC BLOOD PRESSURE: 116 MMHG | WEIGHT: 137 LBS | HEIGHT: 65 IN

## 2019-04-08 DIAGNOSIS — M79.675 TOE PAIN, LEFT: ICD-10-CM

## 2019-04-08 DIAGNOSIS — L84 CORNS AND CALLOSITIES: Primary | ICD-10-CM

## 2019-04-08 PROCEDURE — 99203 OFFICE O/P NEW LOW 30 MIN: CPT | Performed by: PODIATRIST

## 2019-04-08 ASSESSMENT — MIFFLIN-ST. JEOR: SCORE: 1117.31

## 2019-04-08 NOTE — PATIENT INSTRUCTIONS
Thank you for choosing [facility]!    DR. GIRALDO'S CLINIC LOCATIONS     MONDAY - [place] WEDNESDAY - [place]   [address] [place] [address] [address] [address]   [phone] / [phone] / [phone]       THURSDAY - [place] SCHEDULE SURGERY: [phone]   [address] APPOINTMENTS: [phone]   [place] [address] BILLING QUESTIONS: [phone] / [phone]           BODY WEIGHT AND YOUR FEET  The following information is included in the after visit summary for all patients. Body weight can be a sensitive issue to discuss in clinic, but we think the following information is very important. Although we focus on the feet and ankles, we do support the overall health of our patients.     Many things can cause foot and ankle problems. Foot structure, activity level, foot mechanics and injuries are common causes of pain. One very important issue that often goes unmentioned, is body weight. Extra weight can cause increased stress on muscles, ligaments, bones and tendons. Sometimes just a few extra pounds is all it takes to put one over her/his threshold. Without reducing that stress, it can be difficult to alleviate pain. As Foot & Ankle specialists, our job is addressing the lower extremity problem and possible causes. Regarding extra body weight, we encourage patients to discuss diet and weight management plans with their primary care doctors. It is this team approach that gives you the best opportunity for pain relief and getting you back on your feet.      [facility] has a Comprehensive Weight Management Program. This program includes counseling, education, non-surgical and surgical approaches to weight loss. If you are interested in learning more either talk to you primary care provider or call [phone].

## 2019-04-08 NOTE — LETTER
2019         RE: Loly Oliveira  [address]  [facility] [address]        Dear Colleague,    Thank you for referring your patient, Loly Oliveira, to the [facility]. Please see a copy of my visit note below.    ASSESSMENT/PLAN:    Encounter Diagnoses   Name Primary?     Corns and callosities Yes     Toe pain, left      I explained what a corn is and how it is related to footwear, he the position and the width of the toe joints.  She is encouraged to try to file it down.  I recommend that she try a toe spacer or other pad.  I am happy to trim the lesion down in the future, but explained it will likely be an out of pocket expense. I did not bill for this service today, as they were not aware of this.     Using a #15 scalpel, I trimmed/ cored out the small hyperkeratotic lesion, medial left 2nd toe.  No bleeding.    Body mass index is 22.8 kg/m .    Kristopher Peraza DPM, FACFAS, MS    [facility]      ____________________________________________________________________    HPI:           Chief Complaint: growth on left second toe  Onset of problem: 2 months  Pain/ discomfort is described as:  Deep ache  Pain Ratin/10   Frequency:  daily    The pain is exacerbated by walking  Previous treatment: none  *  Past Medical History:   Diagnosis Date     Bell's palsy      Chondromalacia of patella      Dementia without behavioral disturbance, unspecified dementia type 2015     Diverticulitis of colon (without mention of hemorrhage)(562.11)      Esophageal reflux      OSTEOPENIA      Raynaud disease      RESTLESS LEGS SYNDROME     Restless Legs Syndrome     Rosacea      TINNITUS       Trochanteric bursitis     right   *  *  Past Surgical History:   Procedure Laterality Date     C ANTER VESICOURETHROPEXY,SIMPLE       C ANTER VESICOURETHROPEXY,SIMPLE      bergeron     C NONSPECIFIC PROCEDURE      right arthroscopic surgery      C NONSPECIFIC PROCEDURE  1974    right shoulder decompression     C NONSPECIFIC PROCEDURE  Feb 2010    Closed manipulation, left shoulder     C TOTAL ABDOM HYSTERECTOMY  1986     HC DILATION/CURETTAGE DIAG/THER NON OB  1970     HC REPAIR ROTATOR CUFF,CHRONIC       HERNIA REPAIR, UMBILICAL  1989     SALPINGO OOPHORECTOMY,R/L/CATHIE  1986     TONSILLECTOMY & ADENOIDECTOMY  1947   *  *  Current Outpatient Medications   Medication Sig Dispense Refill     memantine (NAMENDA) 10 MG tablet Take 1 tablet (10 mg) by mouth 2 times daily 180 tablet 3     rivastigmine (EXELON) 3 MG capsule Take 1 capsule (3 mg) by mouth 2 times daily 180 capsule 3       ROS:    A 10-point review of systems was done and is negative except for that noted in the HPI and seen below.     Numbness in feet?  yes   Calf pain with walking? no  Recent foot/ankle injury? no  Weight change over past 12 months? 10# loss  Self perception as overweight? no  Recent flu-like symptoms? no  Joint pain other than feet ? shoulder    Social History: Employment:  no;  Exercise/Physical activity:  Walking 3x/ week;  Tobacco use:  no  Social History     Socioeconomic History     Marital status:      Spouse name: Ruy     Number of children: 4     Years of education: Not on file     Highest education level: Not on file   Occupational History     Employer: [employer]   Social Needs     Financial resource strain: Not on file     Food insecurity:     Worry: Not on file     Inability: Not on file     Transportation needs:     Medical: Not on file     Non-medical: Not on file   Tobacco Use     Smoking status: Never Smoker     Smokeless tobacco: Never Used   Substance and Sexual Activity     Alcohol use: Yes     Comment: 1-2 times per year     Drug use: No     Sexual activity: Yes     Partners: Male     Birth control/protection: Surgical   Lifestyle     Physical activity:     Days per week: Not on file     Minutes per session: Not on file     Stress: Not  on file   Relationships     Social connections:     Talks on phone: Not on file     Gets together: Not on file     Attends Judaism service: Not on file     Active member of club or organization: Not on file     Attends meetings of clubs or organizations: Not on file     Relationship status: Not on file     Intimate partner violence:     Fear of current or ex partner: Not on file     Emotionally abused: Not on file     Physically abused: Not on file     Forced sexual activity: Not on file   Other Topics Concern      Service Not Asked     Blood Transfusions No     Caffeine Concern Not Asked     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 Not Asked   Social History Narrative    Living arrangements - the patient lives with their spouse.        Family history:  Family History   Problem Relation Age of Onset     Cancer Father         Lung; D: age 64     Alcohol/Drug Father         alcohol     Depression Father      C.A.D. Brother         MI at age 50; b: 1949     Diabetes Paternal Grandmother         Insulin dependent     Osteoporosis Paternal Grandmother      Cardiovascular Mother         MVR     Cerebrovascular Disease Mother      Gastrointestinal Disease Mother         diverticulitis     Osteoporosis Mother      Eye Disorder Son         Cone shaped corneas     Musculoskeletal Disorder Other         3 cousins with MD     Neurologic Disorder Other         2 cousins with spinabifida and hydrocephalic     Alcohol/Drug Son         drugs     Breast Cancer Other         cousin premenopause     Neurologic Disorder Other         Grandson  Tourettes       Rheumatoid  "arthritis:  no  Foot Problems: no  Diabetes: no        EXAM:    Vitals: /62   Ht 1.651 m (5' 5\")   Wt 62.1 kg (137 lb)   BMI 22.80 kg/m     BMI: Body mass index is 22.8 kg/m .    Constitutional:  Loly Oliveira is in no apparent distress, appears well-nourished.  Cooperative with history and physical exam.    Vascular:  Pedal pulses are palpable for both the DP and PT arteries.  CFT < 3 sec.  No edema.  Pedal hair growth present.    Neuro: Light touch sensation is intact to the L4, L5, S1 distributions  No evidence of weakness, spasticity, or contracture in the lower extremities.     Derm: Normal texture and turgor.  No erythema, ecchymosis, or cyanosis.  No open lesions. Small, nucleated hyperkeratotic lesion medial left 2nd toe.     Musculoskeletal:    Lower extremity muscle strength is normal.  Patient is ambulatory without an assistive device or brace . Decrease in medial longitudinal arch with weight bearing. Flexible feet.  Left 2nd toe has a medial deviation and contacts the hallux.              Again, thank you for allowing me to participate in the care of your patient.        Sincerely,        Kristopher Peraza, EVELIN    "

## 2019-04-08 NOTE — PROGRESS NOTES
ASSESSMENT/PLAN:    Encounter Diagnoses   Name Primary?     Corns and callosities Yes     Toe pain, left      I explained what a corn is and how it is related to footwear, he the position and the width of the toe joints.  She is encouraged to try to file it down.  I recommend that she try a toe spacer or other pad.  I am happy to trim the lesion down in the future, but explained it will likely be an out of pocket expense. I did not bill for this service today, as they were not aware of this.     Using a #15 scalpel, I trimmed/ cored out the small hyperkeratotic lesion, medial left 2nd toe.  No bleeding.    Body mass index is 22.8 kg/m .    Kristopher Peraza DPM, FACFAS, MS    [facility]      ____________________________________________________________________    HPI:           Chief Complaint: growth on left second toe  Onset of problem: 2 months  Pain/ discomfort is described as:  Deep ache  Pain Ratin/10   Frequency:  daily    The pain is exacerbated by walking  Previous treatment: none  *  Past Medical History:   Diagnosis Date     Bell's palsy      Chondromalacia of patella      Dementia without behavioral disturbance, unspecified dementia type 2015     Diverticulitis of colon (without mention of hemorrhage)(562.11)      Esophageal reflux      OSTEOPENIA      Raynaud disease      RESTLESS LEGS SYNDROME     Restless Legs Syndrome     Rosacea      TINNITUS       Trochanteric bursitis     right   *  *  Past Surgical History:   Procedure Laterality Date     C ANTER VESICOURETHROPEXY,SIMPLE       C ANTER VESICOURETHROPEXY,SIMPLE      bergeron     C NONSPECIFIC PROCEDURE      right arthroscopic surgery     C NONSPECIFIC PROCEDURE      right shoulder decompression     C NONSPECIFIC PROCEDURE  2010    Closed manipulation, left shoulder     C TOTAL ABDOM HYSTERECTOMY       HC DILATION/CURETTAGE DIAG/THER NON OB  1970     HC REPAIR ROTATOR  CUFF,CHRONIC       HERNIA REPAIR, UMBILICAL  1989     SALPINGO OOPHORECTOMY,R/L/CATHIE  1986     TONSILLECTOMY & ADENOIDECTOMY  1947   *  *  Current Outpatient Medications   Medication Sig Dispense Refill     memantine (NAMENDA) 10 MG tablet Take 1 tablet (10 mg) by mouth 2 times daily 180 tablet 3     rivastigmine (EXELON) 3 MG capsule Take 1 capsule (3 mg) by mouth 2 times daily 180 capsule 3       ROS:    A 10-point review of systems was done and is negative except for that noted in the HPI and seen below.     Numbness in feet?  yes   Calf pain with walking? no  Recent foot/ankle injury? no  Weight change over past 12 months? 10# loss  Self perception as overweight? no  Recent flu-like symptoms? no  Joint pain other than feet ? shoulder    Social History: Employment:  no;  Exercise/Physical activity:  Walking 3x/ week;  Tobacco use:  no  Social History     Socioeconomic History     Marital status:      Spouse name: Ruy     Number of children: 4     Years of education: Not on file     Highest education level: Not on file   Occupational History     Employer: [employer]   Social Needs     Financial resource strain: Not on file     Food insecurity:     Worry: Not on file     Inability: Not on file     Transportation needs:     Medical: Not on file     Non-medical: Not on file   Tobacco Use     Smoking status: Never Smoker     Smokeless tobacco: Never Used   Substance and Sexual Activity     Alcohol use: Yes     Comment: 1-2 times per year     Drug use: No     Sexual activity: Yes     Partners: Male     Birth control/protection: Surgical   Lifestyle     Physical activity:     Days per week: Not on file     Minutes per session: Not on file     Stress: Not on file   Relationships     Social connections:     Talks on phone: Not on file     Gets together: Not on file     Attends Sikhism service: Not on file     Active member of club or organization: Not on file     Attends meetings of clubs or  "organizations: Not on file     Relationship status: Not on file     Intimate partner violence:     Fear of current or ex partner: Not on file     Emotionally abused: Not on file     Physically abused: Not on file     Forced sexual activity: Not on file   Other Topics Concern      Service Not Asked     Blood Transfusions No     Caffeine Concern Not Asked     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 Not Asked   Social History Narrative    Living arrangements - the patient lives with their spouse.        Family history:  Family History   Problem Relation Age of Onset     Cancer Father         Lung; D: age 64     Alcohol/Drug Father         alcohol     Depression Father      C.A.D. Brother         MI at age 50; b: 1949     Diabetes Paternal Grandmother         Insulin dependent     Osteoporosis Paternal Grandmother      Cardiovascular Mother         MVR     Cerebrovascular Disease Mother      Gastrointestinal Disease Mother         diverticulitis     Osteoporosis Mother      Eye Disorder Son         Cone shaped corneas     Musculoskeletal Disorder Other         3 cousins with MD     Neurologic Disorder Other         2 cousins with spinabifida and hydrocephalic     Alcohol/Drug Son         drugs     Breast Cancer Other         cousin premenopause     Neurologic Disorder Other         Grandson  Tourettes       Rheumatoid arthritis:  no  Foot Problems: no  Diabetes: no        EXAM:    Vitals: /62   Ht 1.651 m (5' 5\")   Wt 62.1 kg (137 lb)   BMI 22.80 kg/m    BMI: Body mass index is 22.8 kg/m .    Constitutional:  Loly Oliveira is in no apparent distress, " appears well-nourished.  Cooperative with history and physical exam.    Vascular:  Pedal pulses are palpable for both the DP and PT arteries.  CFT < 3 sec.  No edema.  Pedal hair growth present.    Neuro: Light touch sensation is intact to the L4, L5, S1 distributions  No evidence of weakness, spasticity, or contracture in the lower extremities.     Derm: Normal texture and turgor.  No erythema, ecchymosis, or cyanosis.  No open lesions. Small, nucleated hyperkeratotic lesion medial left 2nd toe.     Musculoskeletal:    Lower extremity muscle strength is normal.  Patient is ambulatory without an assistive device or brace . Decrease in medial longitudinal arch with weight bearing. Flexible feet.  Left 2nd toe has a medial deviation and contacts the hallux.

## 2019-06-11 ENCOUNTER — OFFICE VISIT (OUTPATIENT)
Dept: INTERNAL MEDICINE | Facility: CLINIC | Age: 74
End: 2019-06-11
Payer: COMMERCIAL

## 2019-06-11 VITALS
WEIGHT: 141 LBS | SYSTOLIC BLOOD PRESSURE: 112 MMHG | TEMPERATURE: 97.7 F | RESPIRATION RATE: 16 BRPM | BODY MASS INDEX: 23.49 KG/M2 | DIASTOLIC BLOOD PRESSURE: 64 MMHG | OXYGEN SATURATION: 98 % | HEART RATE: 64 BPM | HEIGHT: 65 IN

## 2019-06-11 DIAGNOSIS — Z12.31 ENCOUNTER FOR SCREENING MAMMOGRAM FOR BREAST CANCER: ICD-10-CM

## 2019-06-11 DIAGNOSIS — Z00.00 MEDICARE ANNUAL WELLNESS VISIT, SUBSEQUENT: ICD-10-CM

## 2019-06-11 DIAGNOSIS — F03.90 DEMENTIA WITHOUT BEHAVIORAL DISTURBANCE, UNSPECIFIED DEMENTIA TYPE: ICD-10-CM

## 2019-06-11 DIAGNOSIS — M85.80 OSTEOPENIA, UNSPECIFIED LOCATION: ICD-10-CM

## 2019-06-11 DIAGNOSIS — L98.9 SKIN DISORDER: ICD-10-CM

## 2019-06-11 PROCEDURE — 99214 OFFICE O/P EST MOD 30 MIN: CPT | Mod: 25 | Performed by: INTERNAL MEDICINE

## 2019-06-11 PROCEDURE — G0439 PPPS, SUBSEQ VISIT: HCPCS | Performed by: INTERNAL MEDICINE

## 2019-06-11 RX ORDER — RIVASTIGMINE TARTRATE 3 MG/1
3 CAPSULE ORAL 2 TIMES DAILY
Qty: 180 CAPSULE | Refills: 3 | Status: SHIPPED | OUTPATIENT
Start: 2019-06-11 | End: 2020-02-11

## 2019-06-11 RX ORDER — MEMANTINE HYDROCHLORIDE 10 MG/1
10 TABLET ORAL 2 TIMES DAILY
Qty: 180 TABLET | Refills: 3 | Status: SHIPPED | OUTPATIENT
Start: 2019-06-11 | End: 2020-02-11

## 2019-06-11 ASSESSMENT — ACTIVITIES OF DAILY LIVING (ADL)
CURRENT_FUNCTION: HOUSEWORK REQUIRES ASSISTANCE
CURRENT_FUNCTION: LAUNDRY REQUIRES ASSISTANCE
CURRENT_FUNCTION: TRANSPORTATION REQUIRES ASSISTANCE
CURRENT_FUNCTION: TELEPHONE REQUIRES ASSISTANCE
CURRENT_FUNCTION: SHOPPING REQUIRES ASSISTANCE
CURRENT_FUNCTION: PREPARING MEALS REQUIRES ASSISTANCE
CURRENT_FUNCTION: MONEY MANAGEMENT REQUIRES ASSISTANCE
CURRENT_FUNCTION: MEDICATION ADMINISTRATION REQUIRES ASSISTANCE
CURRENT_FUNCTION: BATHING REQUIRES ASSISTANCE

## 2019-06-11 ASSESSMENT — MIFFLIN-ST. JEOR: SCORE: 1135.45

## 2019-06-11 NOTE — PATIENT INSTRUCTIONS
Continue current meds  Prescriptions refilled.    Vaccinations: Td (tetanus)  at [facility] pharmacy   Check with insurance or speak with your pharmacist re: Shingrix vaccine coverage for shingles prevention.  This is a 2 shot series done 2-6 months apart  See me in 6 months  (December) or earlier as needed   Mammogram, after 9/20/19   Flu shot this Fall   Appt with dermatology at [facility] 3rd floor re: right facial skin  Change. Call for appt  Bone density test - [facility]. Call for appt

## 2019-06-11 NOTE — PROGRESS NOTES
"SUBJECTIVE:   Loly Oliveira is a 73 year old female who presents for Preventive Visit. Seen with  today who provides hx for pt    Are you in the first 12 months of your Medicare coverage?  No    Healthy Habits:    In general, how would you rate your overall health?  Very good    Frequency of exercise:  None    Duration of exercise:  Other    Do you usually eat at least 4 servings of fruit and vegetables a day, include whole grains    & fiber and avoid regularly eating high fat or \"junk\" foods?  Yes    Taking medications regularly:  Yes    Barriers to taking medications:  None    Medication side effects:  None    Ability to successfully perform activities of daily living:  Housework requires assistance, telephone requires assistance, transportation requires assistance, money management requires assistance, preparing meals requires assistance, shopping requires assistance, laundry requires assistance, medication administration requires assistance and bathing requires assistance    Home Safety:  No safety concerns identified    Hearing Impairment:  No hearing concerns    In the past 6 months, have you been bothered by leaking of urine? Yes    In general, how would you rate your overall mental or emotional health?  Fair      PHQ-2 Total Score:    Additional concerns today:  No    Do you feel safe in your environment? Yes    Do you have a Health Care Directive? Yes: Patient states has Advance Directive and will bring in a copy to clinic.      Fall risk  Fallen 2 or more times in the past year?: No  Any fall with injury in the past year?: No    Cognitive Screening   1) Repeat 3 items (Leader, Season, Table)    2) Clock draw: ABNORMAL   3) 3 item recall: Recalls NO objects   Results: 0 items recalled: PROBABLE COGNITIVE IMPAIRMENT, **INFORM PROVIDER**  Six Item Cognitive Impairment Test   (6CIT):      What year is it?                               Incorrect - 4 points    What month is it?                         "       Incorrect - 3 points      Give the patient an address to remember with five components:   [place] ( first and last name - 2 components)   [address]  (number and name of street - 2 components)   [place] ( city - 1 component)      About what time is it (within the hour)? Incorrect - 3 points    Count backwards from 20 to 1:   More than one error - 4 points    Say the months of the year in reverse: More than one error - 4 points    Repeat the address phrase:   All wrong - 10 points    Total 6CIT Score:      28/28    Interpretation: The 6CIT uses an inverse score and questions are weighted to produce a total out of 28. Scores of 0-7 are considered normal and 8 or more significant.    Advantages The test has high sensitivity without compromising specificity even in mild dementia. It is easy to translate linguistically and culturally.  Disadvantages The main disadvantage is in the scoring and weighting of the test, which is initially confusing, however computer models have simplified this greatly.    Probability Statistics: At the 7/8 cut off: Overall figures sensitivity 90% specificity 100%, in mild dementia sensitivity = 78% , specificity = 100%    Copyright 2000 The [facility], [facility]. Courtesy of Dr. Eric Medrano    Mini-CogTM Copyright S Laisha. Licensed by the author for use in [facility]; reprinted with permission (soob@.[facility]). All rights reserved.      Do you have sleep apnea, excessive snoring or daytime drowsiness?: no    Reviewed and updated as needed this visit by clinical staff         Reviewed and updated as needed this visit by Provider        Social History     Tobacco Use     Smoking status: Never Smoker     Smokeless tobacco: Never Used   Substance Use Topics     Alcohol use: Yes     Comment: 1-2 times per year     If you drink alcohol do you typically have >3 drinks per day or >7 drinks per week? No    Alcohol Use 11/11/2014   Prescreen: >3  "drinks/day or >7 drinks/week? The patient does not drink >3 drinks per day nor >7 drinks per week.               Current providers sharing in care for this patient include:   Patient Care Team:  Pradip Freeman MD as PCP - General  Pradip Freeman MD as Assigned PCP    The following health maintenance items are reviewed in Epic and correct as of today:  Health Maintenance   Topic Date Due     ZOSTER IMMUNIZATION (2 of 3) 02/11/2010     DEXA  03/23/2011     MEDICARE ANNUAL WELLNESS VISIT  11/11/2015     ADVANCED DIRECTIVE PLANNING  09/13/2016     DTAP/TDAP/TD IMMUNIZATION (3 - Td) 01/02/2018     PHQ-2  01/01/2019     LIPID  11/13/2019     MAMMO SCREENING  09/19/2020     COLONOSCOPY  01/19/2025     PNEUMOCOCCAL IMMUNIZATION 65+ LOW/MEDIUM RISK  Completed     IPV IMMUNIZATION  Aged Out     MENINGITIS IMMUNIZATION  Aged Out     Labs reviewed in EPIC      Review of Systems  CONSTITUTIONAL: NEGATIVE for fever, chills, change in weight overall past 1 year  INTEGUMENTARY/SKIN:  POSITIVE for rough skin spot under right eye on face  EYES: NEGATIVE for vision changes or irritation. Has glasses  ENT/MOUTH: NEGATIVE for ear, mouth and throat problems  RESP: NEGATIVE for significant cough or SOB  BREAST: NEGATIVE for masses, tenderness or discharge  CV: NEGATIVE for chest pain, palpitations or peripheral edema  GI: NEGATIVE for nausea, abdominal pain, heartburn, or change in bowel habits  : NEGATIVE for frequency, dysuria, or hematuria  MUSCULOSKELETAL: NEGATIVE for significant arthralgias or myalgia  NEURO: NEGATIVE for weakness, dizziness or paresthesias. POSITIVE for memory loss.  feels about the same as usual. He is wife all the time  ENDOCRINE: NEGATIVE for temperature intolerance. Hx osteopenia and due for repeat DEXA  HEME: NEGATIVE for bleeding problems  PSYCHIATRIC: NEGATIVE for changes in mood or affect    OBJECTIVE:   /64   Pulse 64   Temp 97.7  F (36.5  C) (Oral)   Resp 16   Ht 1.651 m (5' 5\")   Wt 64 " "kg (141 lb)   SpO2 98%   BMI 23.46 kg/m   Estimated body mass index is 22.8 kg/m  as calculated from the following:    Height as of 4/8/19: 1.651 m (5' 5\").    Weight as of 4/8/19: 62.1 kg (137 lb).  Physical Exam  General appearance - alert, no distress  Skin -  1cm area rough raised nonkeratinous skin left facial cheek below orbital area where glasses touch  Head - normocephalic, atraumatic  Eyes - TATIANNA, EOMI, fundi exam with nondilated pupils negative.  Ears - External ears normal. Canals clear. TM's normal.  Nose/Sinuses - Nares normal. Septum midline. Mucosa normal. No drainage or sinus tenderness.  Oropharynx - No erythema, no adenopathy, no exudates.  Neck - Supple without adenopathy or thyromegaly. No bruits.  Lungs - Clear to auscultation without wheezes/rhonchi.  Heart - Regular rate and rhythm without murmurs, clicks, or gallops.  Nodes - No supraclavicular, axillary, or inguinal adenopathy palpable.  Breasts - deferred  Abdomen - Abdomen soft, non-tender. BS normal. No masses or hepatosplenomegaly palpable. No bruits.  Extremities -No cyanosis, clubbing or edema.    Musculoskeletal - Spine ROM normal. Muscular strength intact.   Peripheral pulses - radial=4/4, femoral=4/4, posterior tibial=4/4, dorsalis pedis=4/4,  Neuro - Gait normal. Reflexes normal and symmetric. Sensation grossly WNL.  See above re: 6CIT  Genital/Rectal - deferred      ASSESSMENT / PLAN:   1. Medicare annual wellness visit, subsequent   See plan discussion below for HCM    2. Dementia without behavioral disturbance, unspecified dementia type  6CIT a little worse.  feels status about the same. No falls.  with pt all the time. Pt hd  will be moving to  [facility] and will have nursing help there for pt including giving her medications  - memantine (NAMENDA) 10 MG tablet; Take 1 tablet (10 mg) by mouth 2 times daily  Dispense: 180 tablet; Refill: 3  - rivastigmine (EXELON) 3 MG capsule; Take 1 " "capsule (3 mg) by mouth 2 times daily  Dispense: 180 capsule; Refill: 3    3. Skin disorder  ? SCC. Will have pt see derm  - DERMATOLOGY REFERRAL    4. Encounter for screening mammogram for breast cancer   Future mammogram as ordered  - *MA Screening Digital Bilateral; Future    5. Osteopenia, unspecified location   Due for DEXA as previously ordered      End of Life Planning:  Patient currently has an advanced directive: Yes.  Practitioner is supportive of decision.    COUNSELING:  Reviewed preventive health counseling, as reflected in patient instructions    Estimated body mass index is 22.8 kg/m  as calculated from the following:    Height as of 4/8/19: 1.651 m (5' 5\").    Weight as of 4/8/19: 62.1 kg (137 lb).    Weight management plan noted, stable and monitoring     reports that she has never smoked. She has never used smokeless tobacco.      Appropriate preventive services were discussed with this patient, including applicable screening as appropriate for cardiovascular disease, diabetes, osteopenia/osteoporosis, and glaucoma.  As appropriate for age/gender, discussed screening for colorectal cancer, prostate cancer, breast cancer, and cervical cancer. Checklist reviewing preventive services available has been given to the patient.    Reviewed patients plan of care and provided an AVS. The Basic Care Plan (routine screening as documented in Health Maintenance) for Loly meets the Care Plan requirement. This Care Plan has been established and reviewed with the Patient and  here today.    Counseling Resources:  ATP IV Guidelines  Pooled Cohorts Equation Calculator  Breast Cancer Risk Calculator  FRAX Risk Assessment  ICSI Preventive Guidelines  Dietary Guidelines for Americans, 2010  USDA's MyPlate  ASA Prophylaxis  Lung CA Screening    PLAN:  Continue current meds  Prescriptions refilled.    Vaccinations: Td (tetanus)  at [facility] pharmacy   Check with insurance or speak with your pharmacist re: Shingrix " vaccine coverage for shingles prevention.  This is a 2 shot series done 2-6 months apart  See me in 6 months  (December) or earlier as needed   Mammogram, after 9/20/19   Flu shot this Fall   Appt with dermatology at [facility] 3rd floor re: right facial skin  Change. Call for appt  Bone density test - [facility]. Call for appt                Pradip Freeman MD  [facility]    Identified Health Risks:

## 2019-06-24 ENCOUNTER — TELEPHONE (OUTPATIENT)
Dept: INTERNAL MEDICINE | Facility: CLINIC | Age: 74
End: 2019-06-24

## 2019-06-24 NOTE — TELEPHONE ENCOUNTER
Reason for Call:  Other call back    Detailed comments: Patient is going to an assisted living facility, they would like patient medication list, progress notes, and physical history.  Please fax them to [phone].    Phone Number Patient can be reached at: Other phone number:  [phone]    Best Time: Anytime    Can we leave a detailed message on this number? YES    Call taken on 6/24/2019 at 9:50 AM by Quinn Fermin

## 2019-06-24 NOTE — TELEPHONE ENCOUNTER
Request form faxed over from [facility], by Iris, all needed information printed out and placed in Provider's green folder awaiting signature.

## 2019-06-26 NOTE — TELEPHONE ENCOUNTER
Lupe called again. The pt is moving into the assisted living facility 6/27/19.  The pt's records are needed 6/26/19.  A signed med list is needed, so that the facility can administer pt's medication.  Please fax records listed below to [phone].

## 2019-07-02 ENCOUNTER — MEDICAL CORRESPONDENCE (OUTPATIENT)
Dept: HEALTH INFORMATION MANAGEMENT | Facility: CLINIC | Age: 74
End: 2019-07-02

## 2019-07-08 ENCOUNTER — ANCILLARY PROCEDURE (OUTPATIENT)
Dept: BONE DENSITY | Facility: CLINIC | Age: 74
End: 2019-07-08
Attending: INTERNAL MEDICINE
Payer: COMMERCIAL

## 2019-07-08 DIAGNOSIS — M94.9 DISORDER OF BONE AND CARTILAGE: ICD-10-CM

## 2019-07-08 DIAGNOSIS — M89.9 DISORDER OF BONE AND CARTILAGE: ICD-10-CM

## 2019-07-08 PROCEDURE — 77085 DXA BONE DENSITY AXL VRT FX: CPT | Performed by: INTERNAL MEDICINE

## 2019-07-17 ENCOUNTER — OFFICE VISIT (OUTPATIENT)
Dept: DERMATOLOGY | Facility: CLINIC | Age: 74
End: 2019-07-17
Payer: COMMERCIAL

## 2019-07-17 VITALS — HEART RATE: 69 BPM | SYSTOLIC BLOOD PRESSURE: 127 MMHG | OXYGEN SATURATION: 97 % | DIASTOLIC BLOOD PRESSURE: 65 MMHG

## 2019-07-17 DIAGNOSIS — L57.0 ACTINIC KERATOSES: ICD-10-CM

## 2019-07-17 DIAGNOSIS — L57.8 SOLAR ELASTOSIS: Primary | ICD-10-CM

## 2019-07-17 PROCEDURE — 99213 OFFICE O/P EST LOW 20 MIN: CPT | Mod: 25 | Performed by: PHYSICIAN ASSISTANT

## 2019-07-17 PROCEDURE — 17000 DESTRUCT PREMALG LESION: CPT | Performed by: PHYSICIAN ASSISTANT

## 2019-07-17 PROCEDURE — 17003 DESTRUCT PREMALG LES 2-14: CPT | Performed by: PHYSICIAN ASSISTANT

## 2019-07-17 NOTE — PATIENT INSTRUCTIONS
WOUND CARE INSTRUCTIONS  FOR CRYOSURGERY        This area treated with liquid nitrogen will form a blister. You do not need to bandage the area until after the blister forms and breaks (which may be a few days).  When the blister breaks, begin daily dressing changes as follows:    1) Clean and dry the area with tap water using clean Q-tip or sterile gauze pad.    2) Apply Polysporin ointment or Bacitracin ointment over entire wound.  Do NOT use Neosporin ointment.    3) Cover the wound with a band-aid or sterile non-stick gauze pad and micropore paper tape.      REPEAT THESE INSTRUCTIONS AT LEAST ONCE A DAY UNTIL THE WOUND HAS COMPLETELY HEALED.        It is an old wives tale that a wound heals better when it is exposed to air and allowed to dry out. The wound will heal faster with a better cosmetic result if it is kept moist with ointment and covered with a bandage.  Do not let the wound dry out.      Supplies Needed:     *Cotton tipped applicators (Q-tips)   *Polysporin ointment or Bacitracin ointment (NOT NEOSPORIN)   *Band-aids, or non stick gauze pads and micropore paper tape    PATIENT INFORMATION    During the healing process you will notice a number of changes. All wounds develop a small halo of redness surrounding the wound.  This means healing is occurring. Severe itching with extensive redness usually indicates sensitivity to the ointment or bandage tape used to dress the wound.  You should call our office if this develops.      Swelling and/or discoloration around your surgical site is common, particularly when performed around the eye.    All wounds normally drain.  The larger the wound the more drainage there will be.  After 7-10 days, you will notice the wound beginning to shrink and new skin will begin to grow.  The wound is healed when you can see skin has formed over the entire area.  A healed wound has a healthy, shiny look to the surface and is red to dark pink in color to normalize.  Wounds may  take approximately 4-6 weeks to heal.  Larger wounds may take 6-8 weeks.  After the wound is healed you may discontinue dressing changes.    You may experience a sensation of tightness as your wound heals. This is normal and will gradually subside.    Your healed wound may be sensitive to temperature changes. This sensitivity improves with time, but if you re having a lot of discomfort, try to avoid temperature extremes.    Patients frequently experience itching after their wound appears to have healed because of the continue healing under the skin.  Plain Vaseline will help relieve the itching.             Proper skin care from [facility]:    -Eliminate harsh soaps as they strip the natural oils from the skin, often resulting in dry itchy skin ( i.e. Dial, Zest, [place]ish Spring)  -Use mild soaps such as Cetaphil or Dove Sensitive Skin in the shower. You do not need to use soap on arms, legs, and trunk every time you shower unless visibly soiled.   -Avoid hot or cold showers.  -After showering, lightly dry off and apply moisturizing within 2-3 minutes. This will help trap moisture in the skin.   -Aggressive use of a moisturizer at least 1-2 times a day to the entire body (including -Vanicream, Cetaphil, Aquaphor or Cerave) and moisturize hands after every washing.  -We recommend using moisturizers that come in a tub that needs to be scooped out, not a pump. This has more of an oil base. It will hold moisture in your skin much better than a water base moisturizer. The above recommended are non-pore clogging.      Wear a sunscreen with at least SPF 30 on your face, ears, neck and V of the chest daily. Wear sunscreen on other areas of the body if those areas are exposed to the sun throughout the day. Sunscreens can contain physical and/or chemical blockers. Physical blockers are less likely to clog pores, these include zinc oxide and titanium dioxide. Reapply every two hour and after swimming. Sunscreen examples  include Neutrogena, CeraVe, Blue Lizard, Elta MD and many others.    UV radiation  UVA radiation remains constant throughout the day and throughout the year. It is a longer wavelength than UVB and therefore penetrates deeper into the skin leading to immediate and delayed tanning, photoaging, and skin cancer. 70-80% of UVA and UVB radiation occurs between the hours of 10am-2pm.  UVB radiation  UVB radiation causes the most harmful effects and is more significant during the summer months. However, snow and ice can reflect UVB radiation leading to skin damage during the winter months as well. UVB radiation is responsible for tanning, burning, inflammation, delayed erythema (pinkness), pigmentation (brown spots), and skin cancer.     Follow up 2-3 months if symptom has not improved.

## 2019-07-17 NOTE — PROGRESS NOTES
HPI:  I was asked to see pt by Dr. Freeman. Loly Oliveira is a 73 year old female patient here today for spot on right cheek .  Patient states this has been present for: recent development.  Patient reports the following symptoms: none .  Patient reports the following previous treatments: none.  Patient reports the following modifying factors: none.  Associated symptoms: none.  Patient has no other skin complaints today.  Remainder of the HPI, Meds, PMH, Allergies, FH, and SH was reviewed in chart.    Pertinent Hx:  No personal or family history of skin cancer    Past Medical History:   Diagnosis Date     Bell's palsy      Chondromalacia of patella      Dementia without behavioral disturbance, unspecified dementia type 11/19/2015     Diverticulitis of colon (without mention of hemorrhage)(562.11)      Esophageal reflux      OSTEOPENIA      Raynaud disease      RESTLESS LEGS SYNDROME     Restless Legs Syndrome     Rosacea      TINNITUS       Trochanteric bursitis 9/09    right       Past Surgical History:   Procedure Laterality Date     C ANTER VESICOURETHROPEXY,SIMPLE  1986     C ANTER VESICOURETHROPEXY,SIMPLE  1989    bergerno     C NONSPECIFIC PROCEDURE  1999    right arthroscopic surgery     C NONSPECIFIC PROCEDURE  1974    right shoulder decompression     C NONSPECIFIC PROCEDURE  Feb 2010    Closed manipulation, left shoulder     C TOTAL ABDOM HYSTERECTOMY  1986     HC DILATION/CURETTAGE DIAG/THER NON OB  1970     HC REPAIR ROTATOR CUFF,CHRONIC       HERNIA REPAIR, UMBILICAL  1989     SALPINGO OOPHORECTOMY,R/L/CATHIE  1986     TONSILLECTOMY & ADENOIDECTOMY  1947        Family History   Problem Relation Age of Onset     Cancer Father         Lung; D: age 64     Alcohol/Drug Father         alcohol     Depression Father      C.A.D. Brother         MI at age 50; b: 1949     Diabetes Paternal Grandmother         Insulin dependent     Osteoporosis Paternal Grandmother      Cardiovascular Mother         MVR      Cerebrovascular Disease Mother      Gastrointestinal Disease Mother         diverticulitis     Osteoporosis Mother      Eye Disorder Son         Cone shaped corneas     Musculoskeletal Disorder Other         3 cousins with MD     Neurologic Disorder Other         2 cousins with spinabifida and hydrocephalic     Alcohol/Drug Son         drugs     Breast Cancer Other         cousin premenopause     Neurologic Disorder Other         Grandson  Tourettes       Social History     Socioeconomic History     Marital status:      Spouse name: Ruy     Number of children: 4     Years of education: Not on file     Highest education level: Not on file   Occupational History     Employer: [employer]   Social Needs     Financial resource strain: Not on file     Food insecurity:     Worry: Not on file     Inability: Not on file     Transportation needs:     Medical: Not on file     Non-medical: Not on file   Tobacco Use     Smoking status: Never Smoker     Smokeless tobacco: Never Used   Substance and Sexual Activity     Alcohol use: Yes     Comment: 1-2 times per year     Drug use: No     Sexual activity: Yes     Partners: Male     Birth control/protection: Surgical   Lifestyle     Physical activity:     Days per week: Not on file     Minutes per session: Not on file     Stress: Not on file   Relationships     Social connections:     Talks on phone: Not on file     Gets together: Not on file     Attends Latter-day service: Not on file     Active member of club or organization: Not on file     Attends meetings of clubs or organizations: Not on file     Relationship status: Not on file     Intimate partner violence:     Fear of current or ex partner: Not on file     Emotionally abused: Not on file     Physically abused: Not on file     Forced sexual activity: Not on file   Other Topics Concern      Service Not Asked     Blood Transfusions No     Caffeine Concern Not Asked     Occupational Exposure Yes      Comment: needlestick     Hobby Hazards Not Asked     Sleep Concern Not Asked     Stress Concern Not Asked     Weight Concern Not Asked     Special Diet Not Asked     Back Care Not Asked     Exercise Yes     Comment: walking 3 times weekly     Bike Helmet Not Asked     Seat Belt Yes     Self-Exams Yes     Parent/sibling w/ CABG, MI or angioplasty before 65F 55M? Not Asked   Social History Narrative    Living arrangements - the patient lives with their spouse.        Outpatient Encounter Medications as of 7/17/2019   Medication Sig Dispense Refill     memantine (NAMENDA) 10 MG tablet Take 1 tablet (10 mg) by mouth 2 times daily 180 tablet 3     rivastigmine (EXELON) 3 MG capsule Take 1 capsule (3 mg) by mouth 2 times daily 180 capsule 3     No facility-administered encounter medications on file as of 7/17/2019.        Review Of Systems:  Skin: As above  Eyes: negative  Ears/Nose/Throat: negative  Respiratory: No shortness of breath, dyspnea on exertion, cough, or hemoptysis  Cardiovascular: negative  Gastrointestinal: negative  Genitourinary: negative  Musculoskeletal: negative  Neurologic: negative  Psychiatric: negative  Hematologic/Lymphatic/Immunologic: negative  Endocrine: negative      Objective:     /65   Pulse 69   SpO2 97%   Eyes: Conjunctivae/lids: Normal   ENT: Lips:  Normal  MSK: Normal  Cardiovascular: Peripheral edema none  Pulm: Breathing Normal  Neuro/Psych: Orientation: Normal; Mood/Affect: Normal, NAD, WDWN  Pt accompanied by:  sandro  Following areas examined: face, neck. Pt defers FBE  Mao skin type:ii   Findings:  Pink scaly macule/s x 2 on right cheek x 2  Rhytides, hypo/hyperpigmentation, and atrophy    Assessment and Plan:  1) Actinic keratoses x 2 and solar elastosis    LN2 for 5 seconds x 2. Discussed AE include hypopigmentation (white spot) and recurrence. Follow up in 2-3 months to recheck lesions. There is a 0.025%-20% chance that AKs can develop into a SCC.   Treatment  options include LN2 vs PDT vs Efudex. Pt elected LN2     Signs and Symptoms of non-melanoma skin cancer and ABCDEs of melanoma reviewed with patient. Patient encouraged to perform monthly self skin exams and educated on how to perform them. UV precautions reviewed with patient. Patient was asked about new or changing moles/lesions on body.   Wear a sunscreen with at least SPF 30 on your face, ears, neck and V of the chest daily. Wear sunscreen on other areas of the body if those areas are exposed to the sun throughout the day. Sunscreens can contain physical and/or chemical blockers. Physical blockers are less likely to clog pores, these include zinc oxide and titanium dioxide. Reapply every two hour and after swimming. Sunscreen examples include Neutrogena, CeraVe, Blue Lizard, Elta MD and many others.          Follow up in 2-3 month

## 2019-07-17 NOTE — LETTER
7/17/2019         RE: Loly Oliveira  [address]  [facility] [address]        Dear Colleague,    Thank you for referring your patient, Loly Oliveira, to the [facility]. Please see a copy of my visit note below.    HPI:  I was asked to see pt by Dr. Freeman. Loly Oliveira is a 73 year old female patient here today for spot on right cheek .  Patient states this has been present for: recent development.  Patient reports the following symptoms: none .  Patient reports the following previous treatments: none.  Patient reports the following modifying factors: none.  Associated symptoms: none.  Patient has no other skin complaints today.  Remainder of the HPI, Meds, PMH, Allergies, FH, and SH was reviewed in chart.    Pertinent Hx:  No personal or family history of skin cancer    Past Medical History:   Diagnosis Date     Bell's palsy      Chondromalacia of patella      Dementia without behavioral disturbance, unspecified dementia type 11/19/2015     Diverticulitis of colon (without mention of hemorrhage)(562.11)      Esophageal reflux      OSTEOPENIA      Raynaud disease      RESTLESS LEGS SYNDROME     Restless Legs Syndrome     Rosacea      TINNITUS       Trochanteric bursitis 9/09    right       Past Surgical History:   Procedure Laterality Date     C ANTER VESICOURETHROPEXY,SIMPLE  1986     C ANTER VESICOURETHROPEXY,SIMPLE  1989    bergeron     C NONSPECIFIC PROCEDURE  1999    right arthroscopic surgery     C NONSPECIFIC PROCEDURE  1974    right shoulder decompression     C NONSPECIFIC PROCEDURE  Feb 2010    Closed manipulation, left shoulder     C TOTAL ABDOM HYSTERECTOMY  1986     HC DILATION/CURETTAGE DIAG/THER NON OB  1970     HC REPAIR ROTATOR CUFF,CHRONIC       HERNIA REPAIR, UMBILICAL  1989     SALPINGO OOPHORECTOMY,R/L/CATHIE  1986     TONSILLECTOMY & ADENOIDECTOMY  1947        Family History   Problem Relation Age of Onset     Cancer Father         Lung; D: age 64      Alcohol/Drug Father         alcohol     Depression Father      C.A.D. Brother         MI at age 50; b: 1949     Diabetes Paternal Grandmother         Insulin dependent     Osteoporosis Paternal Grandmother      Cardiovascular Mother         MVR     Cerebrovascular Disease Mother      Gastrointestinal Disease Mother         diverticulitis     Osteoporosis Mother      Eye Disorder Son         Cone shaped corneas     Musculoskeletal Disorder Other         3 cousins with MD     Neurologic Disorder Other         2 cousins with spinabifida and hydrocephalic     Alcohol/Drug Son         drugs     Breast Cancer Other         cousin premenopause     Neurologic Disorder Other         Grandson  Tourettes       Social History     Socioeconomic History     Marital status:      Spouse name: Ruy     Number of children: 4     Years of education: Not on file     Highest education level: Not on file   Occupational History     Employer: [employer]   Social Needs     Financial resource strain: Not on file     Food insecurity:     Worry: Not on file     Inability: Not on file     Transportation needs:     Medical: Not on file     Non-medical: Not on file   Tobacco Use     Smoking status: Never Smoker     Smokeless tobacco: Never Used   Substance and Sexual Activity     Alcohol use: Yes     Comment: 1-2 times per year     Drug use: No     Sexual activity: Yes     Partners: Male     Birth control/protection: Surgical   Lifestyle     Physical activity:     Days per week: Not on file     Minutes per session: Not on file     Stress: Not on file   Relationships     Social connections:     Talks on phone: Not on file     Gets together: Not on file     Attends Rastafarian service: Not on file     Active member of club or organization: Not on file     Attends meetings of clubs or organizations: Not on file     Relationship status: Not on file     Intimate partner violence:     Fear of current or ex partner: Not on file      Emotionally abused: Not on file     Physically abused: Not on file     Forced sexual activity: Not on file   Other Topics Concern      Service Not Asked     Blood Transfusions No     Caffeine Concern Not Asked     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 Not Asked   Social History Narrative    Living arrangements - the patient lives with their spouse.        Outpatient Encounter Medications as of 7/17/2019   Medication Sig Dispense Refill     memantine (NAMENDA) 10 MG tablet Take 1 tablet (10 mg) by mouth 2 times daily 180 tablet 3     rivastigmine (EXELON) 3 MG capsule Take 1 capsule (3 mg) by mouth 2 times daily 180 capsule 3     No facility-administered encounter medications on file as of 7/17/2019.        Review Of Systems:  Skin: As above  Eyes: negative  Ears/Nose/Throat: negative  Respiratory: No shortness of breath, dyspnea on exertion, cough, or hemoptysis  Cardiovascular: negative  Gastrointestinal: negative  Genitourinary: negative  Musculoskeletal: negative  Neurologic: negative  Psychiatric: negative  Hematologic/Lymphatic/Immunologic: negative  Endocrine: negative      Objective:     /65   Pulse 69   SpO2 97%   Eyes: Conjunctivae/lids: Normal   ENT: Lips:  Normal  MSK: Normal  Cardiovascular: Peripheral edema none  Pulm: Breathing Normal  Neuro/Psych: Orientation: Normal; Mood/Affect: Normal, NAD, WDWN  Pt accompanied by:  sandro  Following areas examined: face, neck. Pt defers FBE  Mao skin type:ii   Findings:  Pink scaly macule/s x 2 on right cheek x 2  Rhytides, hypo/hyperpigmentation, and atrophy    Assessment and Plan:  1)  Actinic keratoses x 2 and solar elastosis    LN2 for 5 seconds x 2. Discussed AE include hypopigmentation (white spot) and recurrence. Follow up in 2-3 months to recheck lesions. There is a 0.025%-20% chance that AKs can develop into a SCC.   Treatment options include LN2 vs PDT vs Efudex. Pt elected LN2     Signs and Symptoms of non-melanoma skin cancer and ABCDEs of melanoma reviewed with patient. Patient encouraged to perform monthly self skin exams and educated on how to perform them. UV precautions reviewed with patient. Patient was asked about new or changing moles/lesions on body.   Wear a sunscreen with at least SPF 30 on your face, ears, neck and V of the chest daily. Wear sunscreen on other areas of the body if those areas are exposed to the sun throughout the day. Sunscreens can contain physical and/or chemical blockers. Physical blockers are less likely to clog pores, these include zinc oxide and titanium dioxide. Reapply every two hour and after swimming. Sunscreen examples include Neutrogena, CeraVe, Blue Lizard, Elta MD and many others.          Follow up in 2-3 month      Again, thank you for allowing me to participate in the care of your patient.        Sincerely,        Carine Ward PA-C

## 2019-09-22 ENCOUNTER — APPOINTMENT (OUTPATIENT)
Dept: GENERAL RADIOLOGY | Facility: CLINIC | Age: 74
End: 2019-09-22
Attending: EMERGENCY MEDICINE
Payer: COMMERCIAL

## 2019-09-22 ENCOUNTER — HOSPITAL ENCOUNTER (EMERGENCY)
Facility: CLINIC | Age: 74
Discharge: HOME | End: 2019-09-22
Attending: EMERGENCY MEDICINE | Admitting: EMERGENCY MEDICINE
Payer: COMMERCIAL

## 2019-09-22 VITALS
HEART RATE: 52 BPM | HEIGHT: 63 IN | BODY MASS INDEX: 24.27 KG/M2 | WEIGHT: 137 LBS | TEMPERATURE: 97.6 F | SYSTOLIC BLOOD PRESSURE: 119 MMHG | OXYGEN SATURATION: 99 % | DIASTOLIC BLOOD PRESSURE: 55 MMHG | RESPIRATION RATE: 14 BRPM

## 2019-09-22 DIAGNOSIS — R07.9 CHEST PAIN, UNSPECIFIED TYPE: ICD-10-CM

## 2019-09-22 LAB
ANION GAP SERPL CALCULATED.3IONS-SCNC: 2 MMOL/L (ref 3–14)
BASOPHILS # BLD AUTO: 0 10E9/L (ref 0–0.2)
BASOPHILS NFR BLD AUTO: 0.2 %
BUN SERPL-MCNC: 9 MG/DL (ref 7–30)
CALCIUM SERPL-MCNC: 8.6 MG/DL (ref 8.5–10.1)
CHLORIDE SERPL-SCNC: 107 MMOL/L (ref 94–109)
CO2 SERPL-SCNC: 31 MMOL/L (ref 20–32)
CREAT SERPL-MCNC: 0.67 MG/DL (ref 0.52–1.04)
D DIMER PPP FEU-MCNC: 0.3 UG/ML FEU (ref 0–0.5)
DIFFERENTIAL METHOD BLD: NORMAL
EOSINOPHIL # BLD AUTO: 0 10E9/L (ref 0–0.7)
EOSINOPHIL NFR BLD AUTO: 0.6 %
ERYTHROCYTE [DISTWIDTH] IN BLOOD BY AUTOMATED COUNT: 13 % (ref 10–15)
GFR SERPL CREATININE-BSD FRML MDRD: 86 ML/MIN/{1.73_M2}
GLUCOSE SERPL-MCNC: 83 MG/DL (ref 70–99)
HCT VFR BLD AUTO: 40.1 % (ref 35–47)
HGB BLD-MCNC: 13.3 G/DL (ref 11.7–15.7)
IMM GRANULOCYTES # BLD: 0 10E9/L (ref 0–0.4)
IMM GRANULOCYTES NFR BLD: 0 %
INTERPRETATION ECG - MUSE: NORMAL
LYMPHOCYTES # BLD AUTO: 0.9 10E9/L (ref 0.8–5.3)
LYMPHOCYTES NFR BLD AUTO: 13.8 %
MCH RBC QN AUTO: 32.3 PG (ref 26.5–33)
MCHC RBC AUTO-ENTMCNC: 33.2 G/DL (ref 31.5–36.5)
MCV RBC AUTO: 97 FL (ref 78–100)
MONOCYTES # BLD AUTO: 0.5 10E9/L (ref 0–1.3)
MONOCYTES NFR BLD AUTO: 6.8 %
NEUTROPHILS # BLD AUTO: 5.2 10E9/L (ref 1.6–8.3)
NEUTROPHILS NFR BLD AUTO: 78.6 %
NRBC # BLD AUTO: 0 10*3/UL
NRBC BLD AUTO-RTO: 0 /100
PLATELET # BLD AUTO: 205 10E9/L (ref 150–450)
POTASSIUM SERPL-SCNC: 3.8 MMOL/L (ref 3.4–5.3)
RBC # BLD AUTO: 4.12 10E12/L (ref 3.8–5.2)
SODIUM SERPL-SCNC: 140 MMOL/L (ref 133–144)
TROPONIN I SERPL-MCNC: <0.015 UG/L (ref 0–0.04)
TROPONIN I SERPL-MCNC: <0.015 UG/L (ref 0–0.04)
WBC # BLD AUTO: 6.7 10E9/L (ref 4–11)

## 2019-09-22 PROCEDURE — 71046 X-RAY EXAM CHEST 2 VIEWS: CPT

## 2019-09-22 PROCEDURE — 84484 ASSAY OF TROPONIN QUANT: CPT | Performed by: EMERGENCY MEDICINE

## 2019-09-22 PROCEDURE — 85379 FIBRIN DEGRADATION QUANT: CPT | Performed by: EMERGENCY MEDICINE

## 2019-09-22 PROCEDURE — 99285 EMERGENCY DEPT VISIT HI MDM: CPT | Mod: 25

## 2019-09-22 PROCEDURE — 93005 ELECTROCARDIOGRAM TRACING: CPT

## 2019-09-22 PROCEDURE — 85025 COMPLETE CBC W/AUTO DIFF WBC: CPT | Performed by: EMERGENCY MEDICINE

## 2019-09-22 PROCEDURE — 80048 BASIC METABOLIC PNL TOTAL CA: CPT | Performed by: EMERGENCY MEDICINE

## 2019-09-22 ASSESSMENT — MIFFLIN-ST. JEOR: SCORE: 1085.56

## 2019-09-22 NOTE — ED AVS SNAPSHOT
Emergency Department  [address] [place] [id number]  Phone:  [phone]  Fax:  [phone]                                    Loly Oliveira   MRN: [id number]    Department:   Emergency Department   Date of Visit:  9/22/2019           After Visit Summary Signature Page    I have received my discharge instructions, and my questions have been answered. I have discussed any challenges I see with this plan with the nurse or doctor.    ..........................................................................................................................................  Patient/Patient Representative Signature      ..........................................................................................................................................  Patient Representative Print Name and Relationship to Patient    ..................................................               ................................................  Date                                   Time    ..........................................................................................................................................  Reviewed by Signature/Title    ...................................................              ..............................................  Date                                               Time          22EPIC Rev 08/18

## 2019-09-22 NOTE — ED PROVIDER NOTES
History     Chief Complaint:  Chest Pain      HPI   Loly Oliveira is a 73 year old female with a history of dementia who presents with chest pain. The patient is a limited historian given her dementia, so history is provided by her . The patient's  reports that he went to pick the patient up from her memory care facility today and was informed by staff that the patient had been complaining of some left-sided chest pain. They told him that she reported this pain while walking to the dining agu[place]o for breakfast. This concerned him, prompting him to bring her to the ED for evaluation. Here, the patient states that she does not have chest pain currently and has been feeling well overall. She denies palpitations, jaw pain, shoulder, or back pain. The patient also denies abdominal pain, SOB, leg swelling, or cough.       Cardiac PE/DVT Risk Factors:   The patient and her  deny a history of hypertension, hyperlipidemia, diabetes, or smoking. Review of the patient's history shows a family history of CAD and MI. The patient denies any personal or familial history of PE, DVT, or clotting disorder. The patient and her  deny a history of recent travel, surgery, or other immobilizations.     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 The  "patient reports a paternal history of lung cancer, substance abuse, depression, diabetes, and osteoporosis. The patient reports a fraternal history of CAD and MI. The patient denies any other relevant family history.     Social History:  The patient is  and presents with her . She is currently living in a memory car facility. She reports alcohol use of 1-2 drinks per year and denies tobacco and alcohol use.     Review of Systems   Unable to perform ROS: Dementia     Physical Exam   First Vitals:  BP: 124/49  Pulse: 70  Temp: 97.6  F (36.4  C)  Resp: 16  Height: 160 cm (5' 3\")  Weight: 62.1 kg (137 lb)  SpO2: 97 %    Physical Exam  SKIN:  Warm, dry.  HEMATOLOGIC/IMMUNOLOGIC/LYMPHATIC:  No pallor. No extremity edema.  HENT:  No JVD.  CARDIOVASCULAR:  Regular rate and rhythm, no murmur.  No rub.  Radial pulses equal and normal.  RESPIRATORY:  No respiratory distress, breath sounds equal and normal.  Deep inspiration does not produce thoracic pain.  GASTROINTESTINAL:  Soft, nontender abdomen.  No distention.  No mass.  Normal bowel sounds.  MUSCULOSKELETAL: Normal body habitus.  NEUROLOGIC:  Alert, conversant.  Memory impairment.  PSYCHIATRIC:  Normal mood.    Emergency Department Course   ECG:  Indication: Chest pain  Findings: Normal sinus rhythm. Cannot rule out anterior infarct, age undetermined. Abnormal ECG. ECG read at 9:11 AM by Dr. Salomon.  Ventricular rate: 65 bpm  NM Interval: 120 ms  QRS duration: 72 ms  QT/Qtc: 400/416 ms  R-wave axis: 56     Imaging:  Radiographic findings were communicated with the patient and family who voiced understanding of the findings.    CXR, PA & LAT: IMPRESSION: No acute cardiopulmonary disease. Report per radiology.     Laboratory:  CBC: WNL (WBC 6.7, HGB 13.3, )  BMP: Anion Gap 2 (low), o/w WNL (Creatinine 0.67)  Troponin I (9:22 AM): <0.015  Troponin I (11:23 AM): <0.015  D Dimer: 0.3    Emergency Department Course:  Nursing notes and vitals reviewed. I " performed an exam of the patient as documented above.   IV inserted and blood drawn. The patient was placed on continuous cardiac monitoring and pulse oximetry.   11:08 AM CXR obtained.  Results as above.  Findings discussed with the patient and family.   Findings and plan explained to the Patient and spouse. Patient discharged home with instructions regarding supportive care, medications, and reasons to return. The importance of close follow-up was reviewed.      Impression & Plan      Medical Decision Making:  Loly Oliveira is a 73 year old female who presents with chest discomfort that she reported to her . History is challenged due to her memory impairment. Evaluation was negative. Given no history for her of coronary disease with a negative evaluation in the context of the timing of her symptoms, I think she can be discharged with close follow up. I advised her to return with any concerns pending follow up.       Diagnosis:    ICD-10-CM   1. Chest pain, unspecified type R07.9       Disposition:  Discharged to home.      Sammy FERRO, am serving as a scribe at 9:03 AM on 9/22/2019 to document services personally performed by Kevin Salomon MD, based on my observations and the provider's statements to me.       Kevin Salomon MD  09/22/19 [id number]

## 2019-09-26 ENCOUNTER — ANCILLARY PROCEDURE (OUTPATIENT)
Dept: MAMMOGRAPHY | Facility: CLINIC | Age: 74
End: 2019-09-26
Attending: INTERNAL MEDICINE
Payer: COMMERCIAL

## 2019-09-26 DIAGNOSIS — Z12.31 VISIT FOR SCREENING MAMMOGRAM: ICD-10-CM

## 2019-09-26 DIAGNOSIS — Z12.31 ENCOUNTER FOR SCREENING MAMMOGRAM FOR BREAST CANCER: ICD-10-CM

## 2019-09-26 PROCEDURE — 77067 SCR MAMMO BI INCL CAD: CPT | Mod: TC

## 2019-11-03 ENCOUNTER — HEALTH MAINTENANCE LETTER (OUTPATIENT)
Age: 74
End: 2019-11-03

## 2020-02-05 PROBLEM — F02.81 ALZHEIMER'S DEMENTIA WITH BEHAVIORAL DISTURBANCE, UNSPECIFIED TIMING OF DEMENTIA ONSET: Status: ACTIVE | Noted: 2020-02-05

## 2020-02-05 PROBLEM — G30.9 ALZHEIMER'S DEMENTIA WITH BEHAVIORAL DISTURBANCE, UNSPECIFIED TIMING OF DEMENTIA ONSET: Status: ACTIVE | Noted: 2020-02-05

## 2020-02-11 ENCOUNTER — OFFICE VISIT (OUTPATIENT)
Dept: INTERNAL MEDICINE | Facility: CLINIC | Age: 75
End: 2020-02-11
Payer: COMMERCIAL

## 2020-02-11 VITALS
RESPIRATION RATE: 16 BRPM | WEIGHT: 141 LBS | TEMPERATURE: 98.9 F | HEART RATE: 60 BPM | HEIGHT: 63 IN | SYSTOLIC BLOOD PRESSURE: 122 MMHG | DIASTOLIC BLOOD PRESSURE: 62 MMHG | OXYGEN SATURATION: 99 % | BODY MASS INDEX: 24.98 KG/M2

## 2020-02-11 DIAGNOSIS — M85.80 OSTEOPENIA, UNSPECIFIED LOCATION: ICD-10-CM

## 2020-02-11 DIAGNOSIS — F02.81 ALZHEIMER'S DEMENTIA WITH BEHAVIORAL DISTURBANCE, UNSPECIFIED TIMING OF DEMENTIA ONSET: Primary | ICD-10-CM

## 2020-02-11 DIAGNOSIS — G30.9 ALZHEIMER'S DEMENTIA WITH BEHAVIORAL DISTURBANCE, UNSPECIFIED TIMING OF DEMENTIA ONSET: Primary | ICD-10-CM

## 2020-02-11 DIAGNOSIS — R46.89 BEHAVIORAL CHANGE: ICD-10-CM

## 2020-02-11 LAB
ALBUMIN UR-MCNC: NEGATIVE MG/DL
APPEARANCE UR: CLEAR
BILIRUB UR QL STRIP: NEGATIVE
COLOR UR AUTO: YELLOW
GLUCOSE UR STRIP-MCNC: NEGATIVE MG/DL
HGB UR QL STRIP: ABNORMAL
KETONES UR STRIP-MCNC: NEGATIVE MG/DL
LEUKOCYTE ESTERASE UR QL STRIP: NEGATIVE
NITRATE UR QL: NEGATIVE
PH UR STRIP: 6 PH (ref 5–7)
RBC #/AREA URNS AUTO: NORMAL /HPF
SOURCE: ABNORMAL
SP GR UR STRIP: 1.01 (ref 1–1.03)
UROBILINOGEN UR STRIP-ACNC: 0.2 EU/DL (ref 0.2–1)
WBC #/AREA URNS AUTO: NORMAL /HPF

## 2020-02-11 PROCEDURE — 99215 OFFICE O/P EST HI 40 MIN: CPT | Performed by: INTERNAL MEDICINE

## 2020-02-11 PROCEDURE — 81001 URINALYSIS AUTO W/SCOPE: CPT | Performed by: INTERNAL MEDICINE

## 2020-02-11 RX ORDER — RIVASTIGMINE TARTRATE 3 MG/1
3 CAPSULE ORAL 2 TIMES DAILY
Qty: 180 CAPSULE | Refills: 3 | Status: SHIPPED | OUTPATIENT
Start: 2020-02-11

## 2020-02-11 RX ORDER — MEMANTINE HYDROCHLORIDE 10 MG/1
10 TABLET ORAL 2 TIMES DAILY
Qty: 180 TABLET | Refills: 3 | Status: SHIPPED | OUTPATIENT
Start: 2020-02-11 | End: 2023-01-01

## 2020-02-11 RX ORDER — ESCITALOPRAM OXALATE 10 MG/1
10 TABLET ORAL DAILY
Qty: 30 TABLET | Refills: 11 | Status: SHIPPED | OUTPATIENT
Start: 2020-02-11

## 2020-02-11 ASSESSMENT — MIFFLIN-ST. JEOR: SCORE: 1098.7

## 2020-02-11 NOTE — PATIENT INSTRUCTIONS
Escitalopram 10mg tab. 1/2 tab daily in AM for 1 week. Then increase to 1 tab daily for mood/behaviour  Urinalysis - done  Continue other meds.  Prescriptions refilled.    Memory Care Unit will fax update to [facility] IM dept in 4 weeks ([phone] attention Dr Freeman)  re: behavior or earlier if new side effects with medication or behavior issues worsen prior  See me in 6 months or earlier as needed

## 2020-02-11 NOTE — PROGRESS NOTES
"Subjective     Loyl Oliveira is a 74 year old female who presents to clinic today for the following health issues:    HPI   Chief Complaint   Patient presents with     Follow Up     Per Nursing Home RN, Patient is showing aggression towards staff.     Pt's past medical history, family history, habits, medications and allergies were reviewed with the patient today.  See snap shot for  HCM status. Most recent lab results reviewed with pt. Problem list and histories reviewed & adjusted, as indicated.  Additional history as below:    Patient here with her  who provides history along with information from patient's assisted living.  Patient has severe dementia and not able to give reliable history beyond acute sx questions.  I received a fax from patient's assisted living that patient was having more behavior issues and occasionally yelling at or hitting other residents.  Patient was able to be redirected with behaviors.  Patient afebrile today.  She denies dysuria, cough, shortness of breath.   states that he believes she is sleeping okay.  Dementia progressive despite medication therapy.  Labs have not shown previous reversible causes  History of osteopenia.  Last DEXA from July 2019 reviewed with pt and .  Not to the point of requiring medication and will repeat 2 years later     Additional ROS:   Constitutional, HEENT, Cardiovascular, Pulmonary, GI and , Neuro, MSK and Psych review of systems/symptoms are otherwise negative or unchanged from previous, except as noted above.      OBJECTIVE:  /62   Pulse 60   Temp 98.9  F (37.2  C) (Temporal)   Resp 16   Ht 1.6 m (5' 3\")   Wt 64 kg (141 lb)   SpO2 99%   BMI 24.98 kg/m     Estimated body mass index is 24.98 kg/m  as calculated from the following:    Height as of this encounter: 1.6 m (5' 3\").    Weight as of this encounter: 64 kg (141 lb).     Neck: no adenopathy. Thyroid normal to palpation. No bruits  Pulm: Lungs clear to " auscultation   CV: Regular rates and rhythm  GI: Soft, nontender, Normal active bowel sounds, No hepatosplenomegaly or masses palpable  Ext: Peripheral pulses intact. No edema.  Neuro: Normal strength and tone, sensory exam grossly normal. Memory testing as below    Six Item Cognitive Impairment Test   (6CIT):      What year is it?                               Incorrect - 4 points    What month is it?                               Incorrect - 3 points      Give the patient an address to remember with five components:   [place] ( first and last name - 2 components)   [facility]  (number and name of street - 2 components)   [place] ( city - 1 component)      About what time is it (within the hour)? Incorrect - 3 points    Count backwards from 20 to 1:   More than one error - 4 points    Say the months of the year in reverse: More than one error - 4 points    Repeat the address phrase:   All wrong - 10 points    Total 6CIT Score:      28/28    Interpretation: The 6CIT uses an inverse score and questions are weighted to produce a total out of 28. Scores of 0-7 are considered normal and 8 or more significant.    Advantages The test has high sensitivity without compromising specificity even in mild dementia. It is easy to translate linguistically and culturally.  Disadvantages The main disadvantage is in the scoring and weighting of the test, which is initially confusing, however computer models have simplified this greatly.    Probability Statistics: At the 7/8 cut off: Overall figures sensitivity 90% specificity 100%, in mild dementia sensitivity = 78% , specificity = 100%    Copyright 2000 The [facility], [facility]. Courtesy of Dr. Eric Medrano      Interpretation: The 6CIT uses an inverse score and questions are weighted to produce a total out of 28. Scores of 0-7 are considered normal and 8 or more significant.    Advantages The test has high sensitivity without compromising specificity  even in mild dementia. It is easy to translate linguistically and culturally.  Disadvantages The main disadvantage is in the scoring and weighting of the test, which is initially confusing, however computer models have simplified this greatly.    Probability Statistics: At the 7/8 cut off: Overall figures sensitivity 90% specificity 100%, in mild dementia sensitivity = 78% , specificity = 100%    Copyright 2000 The [facility], [facility], . Courtesy of Dr. Eric Medrano         Assessment/Plan: (See plan discussion below for further details)  1. Alzheimer's dementia with behavioral disturbance, unspecified timing of dementia onset (H)   Pt now in memory care unit. Continue meds. Pt deneis gabriel depression but will add LExapro to see if can help behaviors. If not improving, will then consider Seroquel. Discussed black box warning issue with  re: Seroquel but that med like it still needed for use if behavior issue became worse. Will conitnue redirection. UA to r/o subtle UTI  As cause. Denies pain issues  - memantine (NAMENDA) 10 MG tablet; Take 1 tablet (10 mg) by mouth 2 times daily  Dispense: 180 tablet; Refill: 3  - rivastigmine (EXELON) 3 MG capsule; Take 1 capsule (3 mg) by mouth 2 times daily  Dispense: 180 capsule; Refill: 3  - escitalopram (LEXAPRO) 10 MG tablet; Take 1 tablet (10 mg) by mouth daily  Dispense: 30 tablet; Refill: 11    2. Behavioral change  . See #1 above. Adding Lexapro and r/o UTI  - UA reflex to Microscopic and Culture  - Urine Microscopic    3. Osteopenia, unspecified location   Not to point of needing meds.  Repeat DEXA 2021. Per , pt on Calcium/ Vit D supplement at Memory Care    Plan discussion:   Escitalopram 10mg tab. 1/2 tab daily in AM for 1 week. Then increase to 1 tab daily for mood/behaviour  Urinalysis - done  Continue other meds.  Prescriptions refilled.    Memory Care Unit will fax update to [facility] IM dept in 4 weeks ([phone] attention  Dr Freeman)  re: behavior or earlier if new side effects with medication or behavior issues worsen prior  See me in 6 months or earlier as needed       Pradip Freeman MD  Internal Medicine Department  [facility]    (Chart documentation was completed, in part, with [employer] voice-recognition software. Even though reviewed, some grammatical, spelling, and word errors may remain.)

## 2020-03-06 ENCOUNTER — MYC REFILL (OUTPATIENT)
Dept: INTERNAL MEDICINE | Facility: CLINIC | Age: 75
End: 2020-03-06

## 2020-03-06 DIAGNOSIS — G30.9 ALZHEIMER'S DEMENTIA WITH BEHAVIORAL DISTURBANCE, UNSPECIFIED TIMING OF DEMENTIA ONSET: ICD-10-CM

## 2020-03-06 DIAGNOSIS — F02.81 ALZHEIMER'S DEMENTIA WITH BEHAVIORAL DISTURBANCE, UNSPECIFIED TIMING OF DEMENTIA ONSET: ICD-10-CM

## 2020-03-06 RX ORDER — ESCITALOPRAM OXALATE 10 MG/1
10 TABLET ORAL DAILY
Qty: 30 TABLET | Refills: 11 | Status: CANCELLED | OUTPATIENT
Start: 2020-03-06

## 2020-03-06 RX ORDER — RIVASTIGMINE TARTRATE 3 MG/1
3 CAPSULE ORAL 2 TIMES DAILY
Qty: 180 CAPSULE | Refills: 3 | Status: CANCELLED | OUTPATIENT
Start: 2020-03-06

## 2020-03-06 RX ORDER — MEMANTINE HYDROCHLORIDE 10 MG/1
10 TABLET ORAL 2 TIMES DAILY
Qty: 180 TABLET | Refills: 3 | Status: CANCELLED | OUTPATIENT
Start: 2020-03-06

## 2020-03-13 ENCOUNTER — MEDICAL CORRESPONDENCE (OUTPATIENT)
Dept: HEALTH INFORMATION MANAGEMENT | Facility: CLINIC | Age: 75
End: 2020-03-13

## 2020-03-20 ENCOUNTER — MEDICAL CORRESPONDENCE (OUTPATIENT)
Dept: HEALTH INFORMATION MANAGEMENT | Facility: CLINIC | Age: 75
End: 2020-03-20

## 2020-03-31 ENCOUNTER — MEDICAL CORRESPONDENCE (OUTPATIENT)
Dept: HEALTH INFORMATION MANAGEMENT | Facility: CLINIC | Age: 75
End: 2020-03-31

## 2020-04-01 ENCOUNTER — MEDICAL CORRESPONDENCE (OUTPATIENT)
Dept: HEALTH INFORMATION MANAGEMENT | Facility: CLINIC | Age: 75
End: 2020-04-01

## 2020-04-21 ENCOUNTER — DOCUMENTATION ONLY (OUTPATIENT)
Dept: FAMILY MEDICINE | Facility: CLINIC | Age: 75
End: 2020-04-21

## 2020-04-24 ENCOUNTER — AMBULATORY - HEALTHEAST (OUTPATIENT)
Dept: OTHER | Facility: CLINIC | Age: 75
End: 2020-04-24

## 2020-04-24 ENCOUNTER — DOCUMENTATION ONLY (OUTPATIENT)
Dept: OTHER | Facility: CLINIC | Age: 75
End: 2020-04-24

## 2020-05-13 ENCOUNTER — MEDICAL CORRESPONDENCE (OUTPATIENT)
Dept: HEALTH INFORMATION MANAGEMENT | Facility: CLINIC | Age: 75
End: 2020-05-13

## 2020-06-24 ENCOUNTER — TELEPHONE (OUTPATIENT)
Dept: INTERNAL MEDICINE | Facility: CLINIC | Age: 75
End: 2020-06-24

## 2020-06-24 ENCOUNTER — MEDICAL CORRESPONDENCE (OUTPATIENT)
Dept: HEALTH INFORMATION MANAGEMENT | Facility: CLINIC | Age: 75
End: 2020-06-24

## 2020-06-24 NOTE — TELEPHONE ENCOUNTER
Linette RN at [facility].    Community testing being done at nursing home on staff and residence.  States family is OK with pt being COVID testing.  Needs MD order as well.    Will fax over order but also wants verbal.    Covering providers please approve verbal for COVID testing.

## 2020-06-24 NOTE — TELEPHONE ENCOUNTER
RN called back to Deepak LACKEY.  Relayed provider message.  Deepak LACKEY states understanding.  No further action needed at this time.

## 2020-06-28 ENCOUNTER — RECORDS - HEALTHEAST (OUTPATIENT)
Dept: LAB | Facility: CLINIC | Age: 75
End: 2020-06-28

## 2020-06-28 LAB
SARS-COV-2 PCR COMMENT: NORMAL
SARS-COV-2 RNA SPEC QL NAA+PROBE: NEGATIVE
SARS-COV-2 VIRUS SPECIMEN SOURCE: NORMAL

## 2020-11-16 ENCOUNTER — HEALTH MAINTENANCE LETTER (OUTPATIENT)
Age: 75
End: 2020-11-16

## 2020-12-04 ENCOUNTER — TELEPHONE (OUTPATIENT)
Dept: GERIATRICS | Facility: CLINIC | Age: 75
End: 2020-12-04

## 2020-12-04 ENCOUNTER — TELEPHONE (OUTPATIENT)
Dept: INTERNAL MEDICINE | Facility: CLINIC | Age: 75
End: 2020-12-04

## 2020-12-04 DIAGNOSIS — F02.81 ALZHEIMER'S DEMENTIA WITH BEHAVIORAL DISTURBANCE, UNSPECIFIED TIMING OF DEMENTIA ONSET: Primary | ICD-10-CM

## 2020-12-04 DIAGNOSIS — G30.9 ALZHEIMER'S DEMENTIA WITH BEHAVIORAL DISTURBANCE, UNSPECIFIED TIMING OF DEMENTIA ONSET: Primary | ICD-10-CM

## 2020-12-04 RX ORDER — QUETIAPINE FUMARATE 25 MG/1
25 TABLET, FILM COATED ORAL AT BEDTIME
Qty: 14 TABLET | Refills: 0 | Status: SHIPPED | OUTPATIENT
Start: 2020-12-04 | End: 2021-01-28

## 2020-12-04 NOTE — TELEPHONE ENCOUNTER
Arlen from [facility] calling. Patient's behaviors have been increasingly aggressive to the other residents to the point that they are afraid of her. Wondering if needs some meds/med changes to relieve some aggression and anxiety. Symptoms started about a week ago, feels she is in a new stage of dementia. Pharmacy pending if appropriate.    Arlen [phone]

## 2020-12-04 NOTE — TELEPHONE ENCOUNTER
Spoke with Arlen.  Staff has been trying to redirect the patient as able.  However there are times where she states patient has generally just become more aggressive with walking into other patient's rooms and being bothered when asked to leave.  This is also upsetting to other patients on the memory gama.  Occasional interruption of sleep.  Has not been exhibiting any fevers.  Has occasional urinary frequency but urine has not been malodorous.  Seems to be eating and drinking normally and not having obvious pain.  Patient will have CMP, CBC, urinalysis and TSH drawn with results faxed to us to be sure there are not metabolic or infectious issues going on.  Will start quetiapine 25 mg tablet, 1 tablet at bedtime with a total of 14 tablets only prescribed to see the response over short-term.  Starting with low-dose to be sure patient not oversedated with medication.  Memory care staff will send an update regarding patient status in 1 week via fax or phone call or earlier if having side effects with medication.  Given that the patient is a long-term care at the memory unit and given inability for us to see the patient there and troubles bringing her in to the clinic because of Covid restrictions, feel it would be best medically for patient to be followed by medical staff who comes to the memory care unit. Arlen states that is an option.   I spoke with pt's  Jose Carlos who is in agreement with transferring care to medical staff who comes to [facility]. Spoke again with Arlen and updated her  being agreeable  to transfer of care.  She will contact them and send out necessary paperwork. I asked Arlen to contact our clinic once care has officially been transferred so that we are aware.  We will continue to manage patient between now and then

## 2020-12-05 NOTE — TELEPHONE ENCOUNTER
This is a request for a PRIOR AUTHORIZATION    * Please SUBMIT PRIOR AUTHORIZATION (if appropriate) and UPDATE [facility] access hospital PHARMACY once approved / denied. *    * Patient's Drug Insurance will not pay for this medication without a Prior Authorization in place. *    * Thank you! *    Medication:    QUETIAPINE 25MG    Reason for Request:   PRIOR AUTHORIZATION REQUIRED    Quantity:    #14  Directions:    1 TABLET PO HS    Plan Name:    [employer] PART-D  Insurance Phone #:   [phone]    BIN:     [id number]  PCN:     [id number]  ID:     [id number]  GROUP:    N/A    Additional Notes:   HIGH RISK MED IN ELDERLY       NO DX OF PSYCHOSIS (PER PT'S INS)

## 2020-12-07 NOTE — TELEPHONE ENCOUNTER
Prior Authorization Approval    Authorization Effective Date: 1/1/2020  Authorization Expiration Date: 12/31/2021  Medication: SEROQUEL 25MG-APPROVED  Approved Dose/Quantity:    Reference #:     Insurance Company: [employer] - Phone [phone] Fax [phone]  Expected CoPay:       CoPay Card Available:      Foundation Assistance Needed:    Which Pharmacy is filling the prescription (Not needed for infusion/clinic administered): [facility] - [address]  Pharmacy Notified: Yes  Patient Notified: Yes  **Instructed pharmacy to notify patient when script is ready to /ship.**

## 2020-12-07 NOTE — TELEPHONE ENCOUNTER
Central Prior Authorization Team   Phone: [phone]    PA Initiation    Medication: SEROQUEL 25MG  Insurance Company: [employer] - Phone [phone] Fax [phone]  Pharmacy Filling the Rx: [facility] - [address]  Filling Pharmacy Phone: [phone]  Filling Pharmacy Fax: [phone]  Start Date: 12/7/2020

## 2020-12-08 ENCOUNTER — RECORDS - HEALTHEAST (OUTPATIENT)
Dept: LAB | Facility: CLINIC | Age: 75
End: 2020-12-08

## 2020-12-08 ENCOUNTER — MEDICAL CORRESPONDENCE (OUTPATIENT)
Dept: HEALTH INFORMATION MANAGEMENT | Facility: CLINIC | Age: 75
End: 2020-12-08

## 2020-12-09 ENCOUNTER — TRANSFERRED RECORDS (OUTPATIENT)
Dept: HEALTH INFORMATION MANAGEMENT | Facility: CLINIC | Age: 75
End: 2020-12-09

## 2020-12-09 LAB
ALBUMIN SERPL-MCNC: 3.3 G/DL (ref 3.5–5)
ALBUMIN SERPL-MCNC: 3.3 G/DL (ref 3.5–5)
ALP SERPL-CCNC: 77 U/L (ref 45–120)
ALP SERPL-CCNC: 77 U/L (ref 45–120)
ALT SERPL W P-5'-P-CCNC: <9 U/L (ref 0–45)
ALT SERPL-CCNC: <9 U/L (ref 0–45)
ANION GAP SERPL CALCULATED.3IONS-SCNC: 10 MMOL/L (ref 5–18)
ANION GAP SERPL CALCULATED.3IONS-SCNC: 10 MMOL/L (ref 5–18)
AST SERPL W P-5'-P-CCNC: 18 U/L (ref 0–40)
AST SERPL-CCNC: 18 U/L (ref 0–40)
BILIRUB SERPL-MCNC: 0.5 MG/DL (ref 0–1)
BILIRUB SERPL-MCNC: 0.5 MG/DL (ref 0–1)
BUN SERPL-MCNC: 9 MG/DL (ref 8–28)
BUN SERPL-MCNC: 9 MG/DL (ref 8–28)
CALCIUM SERPL-MCNC: 9.1 MG/DL (ref 8.5–10.5)
CALCIUM SERPL-MCNC: 9.1 MG/DL (ref 8.5–10.5)
CHLORIDE BLD-SCNC: 107 MMOL/L (ref 98–107)
CHLORIDE SERPLBLD-SCNC: 107 MMOL/L (ref 98–107)
CO2 SERPL-SCNC: 27 MMOL/L (ref 22–31)
CO2 SERPL-SCNC: 27 MMOL/L (ref 22–31)
CREAT SERPL-MCNC: 0.66 MG/DL (ref 0.6–1.1)
CREAT SERPL-MCNC: 0.66 MG/DL (ref 0.6–1.1)
ERYTHROCYTE [DISTWIDTH] IN BLOOD BY AUTOMATED COUNT: 12.7 % (ref 11–14.5)
ERYTHROCYTE [DISTWIDTH] IN BLOOD BY AUTOMATED COUNT: 12.7 % (ref 11–14.5)
GFR SERPL CREATININE-BSD FRML MDRD: >60 ML/MIN/1.73M2
GFR SERPL CREATININE-BSD FRML MDRD: >60 ML/MIN/1.73M2
GLUCOSE BLD-MCNC: 92 MG/DL (ref 70–125)
GLUCOSE SERPL-MCNC: 92 MG/DL (ref 70–125)
HCT VFR BLD AUTO: 40.7 % (ref 35–47)
HCT VFR BLD AUTO: 40.7 % (ref 35–47)
HEMOGLOBIN: 13.3 G/DL (ref 12–16)
HGB BLD-MCNC: 13.3 G/DL (ref 12–16)
MCH RBC QN AUTO: 32.7 PG (ref 27–34)
MCH RBC QN AUTO: 32.7 PG (ref 27–34)
MCHC RBC AUTO-ENTMCNC: 32.7 G/DL (ref 32–36)
MCHC RBC AUTO-ENTMCNC: 32.7 G/DL (ref 32–36)
MCV RBC AUTO: 100 FL (ref 80–100)
MCV RBC AUTO: 100 FL (ref 80–100)
PLATELET # BLD AUTO: 209 THOU/UL (ref 140–440)
PLATELET # BLD AUTO: 209 THOU/UL (ref 140–440)
PMV BLD AUTO: 12.1 FL (ref 8.5–12.5)
POTASSIUM BLD-SCNC: 5 MMOL/L (ref 3.5–5)
POTASSIUM SERPL-SCNC: 5 MMOL/L (ref 3.5–5)
PROT SERPL-MCNC: 6.5 G/DL (ref 6–8)
PROT SERPL-MCNC: 6.5 G/DL (ref 6–8)
RBC # BLD AUTO: 4.07 MILL/UL (ref 3.8–5.4)
RBC # BLD AUTO: 4.07 MILL/UL (ref 3.8–5.4)
SODIUM SERPL-SCNC: 144 MMOL/L (ref 136–145)
SODIUM SERPL-SCNC: 144 MMOL/L (ref 136–145)
TSH SERPL DL<=0.005 MIU/L-ACNC: 2.23 UIU/ML (ref 0.3–5)
WBC # BLD AUTO: 7.2 THOU/UL (ref 4–11)
WBC: 7.2 THOU/UL (ref 4–11)

## 2020-12-29 ENCOUNTER — TRANSFERRED RECORDS (OUTPATIENT)
Dept: HEALTH INFORMATION MANAGEMENT | Facility: CLINIC | Age: 75
End: 2020-12-29

## 2020-12-30 ENCOUNTER — RECORDS - HEALTHEAST (OUTPATIENT)
Dept: LAB | Facility: CLINIC | Age: 75
End: 2020-12-30

## 2020-12-30 ENCOUNTER — TELEPHONE (OUTPATIENT)
Dept: INTERNAL MEDICINE | Facility: CLINIC | Age: 75
End: 2020-12-30

## 2020-12-30 LAB
ALBUMIN UR-MCNC: NEGATIVE MG/DL
APPEARANCE UR: CLEAR
BACTERIA #/AREA URNS HPF: ABNORMAL HPF
BILIRUB UR QL STRIP: NEGATIVE
COLOR UR AUTO: YELLOW
COVID-19 VIRUS BY PCR (EXTERNAL RESULT): POSITIVE
GLUCOSE UR STRIP-MCNC: NEGATIVE MG/DL
HGB UR QL STRIP: NEGATIVE
KETONES UR STRIP-MCNC: NEGATIVE MG/DL
LEUKOCYTE ESTERASE UR QL STRIP: ABNORMAL
NITRATE UR QL: NEGATIVE
PH UR STRIP: 7 [PH] (ref 4.5–8)
RBC #/AREA URNS AUTO: ABNORMAL HPF
SCAN LAB RESULTS (EXTERNAL): NORMAL
SP GR UR STRIP: 1.01 (ref 1–1.03)
SQUAMOUS #/AREA URNS AUTO: ABNORMAL LPF
UROBILINOGEN UR STRIP-ACNC: ABNORMAL
WBC #/AREA URNS AUTO: ABNORMAL HPF

## 2020-12-30 NOTE — TELEPHONE ENCOUNTER
Spoke with nurse Verito at [facility].   Gave approval for quetiapine/Seroquel to continue at 25 mg daily at bedtime for 30 days.  On 12/4/2020, and discussed hearing transfer to medical team that comes to patient's facility.  Apparently this was not done however.  Verito will also be working on that transfer let us know when it occurs.  Patient Covid positive.  Temperature and O2 sat is being monitored daily along with other general assessment per nurse.  Patient afebrile with O2 sats 95% today.  They will continue to monitor patient daily.  Patient has O2 sats below 90% for other signs of decompensation, will be seen in the ER.  Otherwise they will let us know if more mild changes.  We will continue to obtain urine sample in addition

## 2020-12-30 NOTE — TELEPHONE ENCOUNTER
Michelle ALCKEY called from [facility] where patient lives. The trial of Seroquel has been helpful and aggression decreased. Pt still has some some aggression towards memory care caregivers. She has been refusing to change clothing and staying in pajamas all day. She is not aggressive with other residents. RN would like to repeat Seroquel.  paid 5-6$ for a 14 day supply because it needed a PA. I don't see that a PA was attempted. The are also having a hard time getting a urine sample on the pt. Unable to do a straight cath at their facility. Unable to get a sample from a brief because it will be contaminated. Will keep trying to get a urine sample. Pt's urinary incontinence has increased and she has darker yellow urine. Will encourage more fluids. Initially family requested a UA and concerned about a UTI. Please note pt did test positive for COVID on 12/28/20 and it's not clear if some of these symptoms are related to that.

## 2020-12-30 NOTE — TELEPHONE ENCOUNTER
Patient's assisted living facility calling to update that patient tested positive for COVID -19 today. She remains asymptomatic at this time.     RN advised to reach out if further treatment needed. Updated. Chart.     Delfina Delong RN, BSN, PHN  [facility]: [place]

## 2021-01-01 ENCOUNTER — NURSE TRIAGE (OUTPATIENT)
Dept: NURSING | Facility: CLINIC | Age: 76
End: 2021-01-01

## 2021-01-01 NOTE — TELEPHONE ENCOUNTER
Nurse from [facility] calling.  UA culture sent over yesterday, but not sure if seen and wanted to get treated before Monday.    Positive for Enterococcus B  ,000.  Med has to go through [facility] pharmacy.    Paged Dr. Pardo at 11:26am\  He is requesting sensitivities and due to it being a faxed UC, unable to find out that information at this time, as didn't have from nurse at [facility].  He will call nurse and obtain that information and determine treatment plan.    Carine Olivia RN on 1/1/2021 at 11:48 AM                  Reason for Disposition    [1] POSITIVE urine test (i.e., NI + or LE + or WBC > 10) AND [2] NO standing order to call in prescription for antibiotic    Additional Information    Negative: [1] Diagnosed urine infection AND [2] female taking antibiotic    Negative: [1] Diagnosed urine infection AND [2] male taking antibiotic    Negative: Patient sounds very sick or weak to the triager    Negative: [1] POSITIVE urine test (i.e., NI + or LE+ or WBC > 10) AND [2] fever > 100.5 F (38.1 C)    Negative: [1] POSITIVE urine test AND [2] side (flank) or lower back pain present    Negative: [1] POSITIVE urine test AND [2] pregnant    Negative: [1] NEGATIVE urine test (i.e., NI - and LE - and WBC < 10) AND [2] urine symptoms continue or are  worsening    Protocols used: URINALYSIS RESULTS FOLLOW-UP CALL-A-

## 2021-01-02 ENCOUNTER — NURSE TRIAGE (OUTPATIENT)
Dept: NURSING | Facility: CLINIC | Age: 76
End: 2021-01-02

## 2021-01-02 NOTE — TELEPHONE ENCOUNTER
Andrea Winn of [place] @ [phone] needs an MD order to run sensitivities on UA.  On call Dr. Pardo paged to RN.  Mattie Liriano RN, [facility] Nurse Advisor      Additional Information    Negative: Nursing judgment or information in reference    Protocols used: NO GUIDELINE [id number]

## 2021-01-04 ENCOUNTER — TELEPHONE (OUTPATIENT)
Dept: INTERNAL MEDICINE | Facility: CLINIC | Age: 76
End: 2021-01-04

## 2021-01-04 ENCOUNTER — MEDICAL CORRESPONDENCE (OUTPATIENT)
Dept: HEALTH INFORMATION MANAGEMENT | Facility: CLINIC | Age: 76
End: 2021-01-04

## 2021-01-04 DIAGNOSIS — N39.0 URINARY TRACT INFECTION WITHOUT HEMATURIA, SITE UNSPECIFIED: Primary | ICD-10-CM

## 2021-01-04 LAB — BACTERIA SPEC CULT: ABNORMAL

## 2021-01-04 RX ORDER — AMOXICILLIN 500 MG/1
500 CAPSULE ORAL 3 TIMES DAILY
Qty: 15 CAPSULE | Refills: 0 | Status: SHIPPED | OUTPATIENT
Start: 2021-01-04 | End: 2021-01-09

## 2021-01-04 NOTE — TELEPHONE ENCOUNTER
Michelle LACKEY was called with provider's message [phone]. She will talk to Arlen LACKEY about transfer (if anything else needed).

## 2021-01-04 NOTE — TELEPHONE ENCOUNTER
Received fax from patient's assisted living.  Urine culture was ordered.  Results were called to Dr Pardo on call but he did not have sensitivities and so no treatment was done at that time.  Culture results show ,000 Enterococcus faecalis which is sensitive to ampicillin, Levaquin and nitrofurantoin and resistant to tetracycline.  Will treat with Amoxicillin 500mg  TID for 5 days. Rx faxed to  Longterm care pharmacy in chart. Inform  Deepak of [place].   Please inform DARYA Mckinley [phone] of treatment plan. There is also fax in [employer] to send them back where I also included the signed order.  Also check if any further info re: transfer of care to provider there at [facility] (see TE 12/4/20 when previously discussed)

## 2021-01-07 ENCOUNTER — MEDICAL CORRESPONDENCE (OUTPATIENT)
Dept: HEALTH INFORMATION MANAGEMENT | Facility: CLINIC | Age: 76
End: 2021-01-07

## 2021-01-28 DIAGNOSIS — F02.81 ALZHEIMER'S DEMENTIA WITH BEHAVIORAL DISTURBANCE, UNSPECIFIED TIMING OF DEMENTIA ONSET: ICD-10-CM

## 2021-01-28 DIAGNOSIS — G30.9 ALZHEIMER'S DEMENTIA WITH BEHAVIORAL DISTURBANCE, UNSPECIFIED TIMING OF DEMENTIA ONSET: ICD-10-CM

## 2021-01-28 RX ORDER — QUETIAPINE FUMARATE 25 MG/1
25 TABLET, FILM COATED ORAL AT BEDTIME
Qty: 14 TABLET | Refills: 0 | Status: SHIPPED | OUTPATIENT
Start: 2021-01-28

## 2021-01-28 NOTE — TELEPHONE ENCOUNTER
Call from [facility] Memory [facility] nursing team    Patient has yet to be establish with [facility] yet. They are asking for another 14 day supply of Seroquel. Delay was r/t spouse completing some paper work.    Medication and pharmacy Td'up.    Torie BENNETTN, RN, PHN

## 2021-01-28 NOTE — TELEPHONE ENCOUNTER
RF done for 14 more days to cover until pt's care can be transferred to  memory care center providers. Let memory  care center know and ask them to notify us by fax as soon as transfer of care  completed

## 2021-02-04 DIAGNOSIS — G30.9 ALZHEIMER'S DEMENTIA WITH BEHAVIORAL DISTURBANCE, UNSPECIFIED TIMING OF DEMENTIA ONSET: ICD-10-CM

## 2021-02-04 DIAGNOSIS — F02.81 ALZHEIMER'S DEMENTIA WITH BEHAVIORAL DISTURBANCE, UNSPECIFIED TIMING OF DEMENTIA ONSET: ICD-10-CM

## 2021-02-04 NOTE — TELEPHONE ENCOUNTER
Seroquel    Routing refill request to provider for review/approval because:  Please advise if ok with refilling? We have not yet received an update if patient has transferred to  [facility] providers.    Torie RADER, RN, PHN

## 2021-02-05 NOTE — TELEPHONE ENCOUNTER
See  RF request from 1/28/21. Pt was to be transferring care to [facility] but [facility]  Was waiting on  to sign final paperwork. Call them to see if pt has transferred care and, if not, what the delay is since this should have been addressed by now. Then route update back to me to review

## 2021-02-05 NOTE — TELEPHONE ENCOUNTER
Nurse from pt's living facility ([facility]) calling. Patient had admission appt from [facility] today. She will send refill request to that provider. Ok to update PCP and inform pharmacy?

## 2021-02-06 RX ORDER — QUETIAPINE FUMARATE 25 MG/1
25 TABLET, FILM COATED ORAL AT BEDTIME
Qty: 31 TABLET | Refills: 0 | OUTPATIENT
Start: 2021-02-06

## 2021-04-12 ENCOUNTER — RECORDS - HEALTHEAST (OUTPATIENT)
Dept: LAB | Facility: CLINIC | Age: 76
End: 2021-04-12

## 2021-04-12 LAB
FOLATE SERPL-MCNC: 12.1 NG/ML
VIT B12 SERPL-MCNC: 392 PG/ML (ref 213–816)

## 2021-04-13 LAB — 25(OH)D3 SERPL-MCNC: 14.2 NG/ML (ref 30–80)

## 2021-05-06 ENCOUNTER — RECORDS - HEALTHEAST (OUTPATIENT)
Dept: LAB | Facility: CLINIC | Age: 76
End: 2021-05-06

## 2021-05-11 NOTE — NURSING NOTE
"Chief Complaint   Patient presents with     Dementia     follow up       Initial /72  Pulse 69  Temp 97.8  F (36.6  C) (Oral)  Ht 5' 3.5\" (1.613 m)  Wt 159 lb (72.1 kg)  SpO2 97%  Breastfeeding? No  BMI 27.72 kg/m2 Estimated body mass index is 27.72 kg/(m^2) as calculated from the following:    Height as of this encounter: 5' 3.5\" (1.613 m).    Weight as of this encounter: 159 lb (72.1 kg).  Medication Reconciliation: complete    " 05/11/21 11:35 AM     See documentation in the [employer] SmartForm       Marco Ambrocio

## 2021-05-14 ENCOUNTER — RECORDS - HEALTHEAST (OUTPATIENT)
Dept: LAB | Facility: CLINIC | Age: 76
End: 2021-05-14

## 2021-10-13 ENCOUNTER — LAB REQUISITION (OUTPATIENT)
Dept: LAB | Facility: CLINIC | Age: 76
End: 2021-10-13
Payer: COMMERCIAL

## 2021-10-13 DIAGNOSIS — F03.918 UNSPECIFIED DEMENTIA WITH BEHAVIORAL DISTURBANCE: ICD-10-CM

## 2021-10-13 DIAGNOSIS — E55.9 VITAMIN D DEFICIENCY, UNSPECIFIED: ICD-10-CM

## 2021-10-14 LAB
ALBUMIN SERPL-MCNC: 3.6 G/DL (ref 3.5–5)
ALP SERPL-CCNC: 76 U/L (ref 45–120)
ALT SERPL W P-5'-P-CCNC: 13 U/L (ref 0–45)
ANION GAP SERPL CALCULATED.3IONS-SCNC: 10 MMOL/L (ref 5–18)
AST SERPL W P-5'-P-CCNC: 19 U/L (ref 0–40)
BILIRUB SERPL-MCNC: 0.4 MG/DL (ref 0–1)
BUN SERPL-MCNC: 11 MG/DL (ref 8–28)
CALCIUM SERPL-MCNC: 9.5 MG/DL (ref 8.5–10.5)
CHLORIDE BLD-SCNC: 105 MMOL/L (ref 98–107)
CO2 SERPL-SCNC: 28 MMOL/L (ref 22–31)
CREAT SERPL-MCNC: 0.68 MG/DL (ref 0.6–1.1)
ERYTHROCYTE [DISTWIDTH] IN BLOOD BY AUTOMATED COUNT: 12.7 % (ref 10–15)
GFR SERPL CREATININE-BSD FRML MDRD: 85 ML/MIN/1.73M2
GLUCOSE BLD-MCNC: 69 MG/DL (ref 70–125)
HCT VFR BLD AUTO: 41.9 % (ref 35–47)
HGB BLD-MCNC: 13.8 G/DL (ref 11.7–15.7)
MCH RBC QN AUTO: 32.9 PG (ref 26.5–33)
MCHC RBC AUTO-ENTMCNC: 32.9 G/DL (ref 31.5–36.5)
MCV RBC AUTO: 100 FL (ref 78–100)
PLATELET # BLD AUTO: 194 10E3/UL (ref 150–450)
POTASSIUM BLD-SCNC: 3.5 MMOL/L (ref 3.5–5)
PROT SERPL-MCNC: 6.7 G/DL (ref 6–8)
RBC # BLD AUTO: 4.2 10E6/UL (ref 3.8–5.2)
SODIUM SERPL-SCNC: 143 MMOL/L (ref 136–145)
TSH SERPL DL<=0.005 MIU/L-ACNC: 1.2 UIU/ML (ref 0.3–5)
WBC # BLD AUTO: 8.4 10E3/UL (ref 4–11)

## 2021-10-14 PROCEDURE — 80053 COMPREHEN METABOLIC PANEL: CPT | Mod: ORL | Performed by: FAMILY MEDICINE

## 2021-10-14 PROCEDURE — 85027 COMPLETE CBC AUTOMATED: CPT | Mod: ORL | Performed by: FAMILY MEDICINE

## 2021-10-14 PROCEDURE — 84443 ASSAY THYROID STIM HORMONE: CPT | Mod: ORL | Performed by: FAMILY MEDICINE

## 2021-10-14 PROCEDURE — 82306 VITAMIN D 25 HYDROXY: CPT | Mod: ORL | Performed by: FAMILY MEDICINE

## 2021-10-14 PROCEDURE — P9603 ONE-WAY ALLOW PRORATED MILES: HCPCS | Mod: ORL | Performed by: FAMILY MEDICINE

## 2021-10-14 PROCEDURE — 36415 COLL VENOUS BLD VENIPUNCTURE: CPT | Mod: ORL | Performed by: FAMILY MEDICINE

## 2021-10-15 LAB — DEPRECATED CALCIDIOL+CALCIFEROL SERPL-MC: 39 UG/L (ref 30–80)

## 2022-01-01 ENCOUNTER — APPOINTMENT (OUTPATIENT)
Dept: CT IMAGING | Facility: CLINIC | Age: 77
End: 2022-01-01
Attending: EMERGENCY MEDICINE
Payer: COMMERCIAL

## 2022-01-01 ENCOUNTER — HOSPITAL ENCOUNTER (EMERGENCY)
Facility: CLINIC | Age: 77
Discharge: HOME | End: 2022-11-03
Attending: EMERGENCY MEDICINE | Admitting: EMERGENCY MEDICINE
Payer: COMMERCIAL

## 2022-01-01 VITALS
SYSTOLIC BLOOD PRESSURE: 148 MMHG | HEART RATE: 70 BPM | RESPIRATION RATE: 18 BRPM | OXYGEN SATURATION: 90 % | TEMPERATURE: 97.7 F | DIASTOLIC BLOOD PRESSURE: 77 MMHG

## 2022-01-01 DIAGNOSIS — R40.4 ALTERED LEVEL OF CONSCIOUSNESS: ICD-10-CM

## 2022-01-01 LAB
ALBUMIN UR-MCNC: NEGATIVE MG/DL
AMORPH CRY #/AREA URNS HPF: ABNORMAL /HPF
ANION GAP SERPL CALCULATED.3IONS-SCNC: 3 MMOL/L (ref 3–14)
APPEARANCE UR: ABNORMAL
ATRIAL RATE - MUSE: 70 BPM
BASOPHILS # BLD AUTO: 0.1 10E3/UL (ref 0–0.2)
BASOPHILS NFR BLD AUTO: 1 %
BILIRUB UR QL STRIP: NEGATIVE
BUN SERPL-MCNC: 12 MG/DL (ref 7–30)
CALCIUM SERPL-MCNC: 8.9 MG/DL (ref 8.5–10.1)
CHLORIDE BLD-SCNC: 107 MMOL/L (ref 94–109)
CO2 SERPL-SCNC: 30 MMOL/L (ref 20–32)
COLOR UR AUTO: ABNORMAL
CREAT SERPL-MCNC: 0.63 MG/DL (ref 0.52–1.04)
DIASTOLIC BLOOD PRESSURE - MUSE: NORMAL MMHG
EOSINOPHIL # BLD AUTO: 0 10E3/UL (ref 0–0.7)
EOSINOPHIL NFR BLD AUTO: 0 %
ERYTHROCYTE [DISTWIDTH] IN BLOOD BY AUTOMATED COUNT: 12.8 % (ref 10–15)
GFR SERPL CREATININE-BSD FRML MDRD: 90 ML/MIN/1.73M2
GLUCOSE BLD-MCNC: 98 MG/DL (ref 70–99)
GLUCOSE UR STRIP-MCNC: NEGATIVE MG/DL
HCT VFR BLD AUTO: 39 % (ref 35–47)
HGB BLD-MCNC: 13.3 G/DL (ref 11.7–15.7)
HGB UR QL STRIP: NEGATIVE
IMM GRANULOCYTES # BLD: 0.1 10E3/UL
IMM GRANULOCYTES NFR BLD: 1 %
INTERPRETATION ECG - MUSE: NORMAL
KETONES UR STRIP-MCNC: NEGATIVE MG/DL
LEUKOCYTE ESTERASE UR QL STRIP: NEGATIVE
LYMPHOCYTES # BLD AUTO: 1.3 10E3/UL (ref 0.8–5.3)
LYMPHOCYTES NFR BLD AUTO: 12 %
MCH RBC QN AUTO: 32.8 PG (ref 26.5–33)
MCHC RBC AUTO-ENTMCNC: 34.1 G/DL (ref 31.5–36.5)
MCV RBC AUTO: 96 FL (ref 78–100)
MONOCYTES # BLD AUTO: 0.5 10E3/UL (ref 0–1.3)
MONOCYTES NFR BLD AUTO: 5 %
NEUTROPHILS # BLD AUTO: 8.7 10E3/UL (ref 1.6–8.3)
NEUTROPHILS NFR BLD AUTO: 81 %
NITRATE UR QL: NEGATIVE
NRBC # BLD AUTO: 0 10E3/UL
NRBC BLD AUTO-RTO: 0 /100
P AXIS - MUSE: 40 DEGREES
PH UR STRIP: 7.5 [PH] (ref 5–7)
PLATELET # BLD AUTO: 183 10E3/UL (ref 150–450)
POTASSIUM BLD-SCNC: 4.1 MMOL/L (ref 3.4–5.3)
PR INTERVAL - MUSE: 128 MS
QRS DURATION - MUSE: 72 MS
QT - MUSE: 390 MS
QTC - MUSE: 421 MS
R AXIS - MUSE: 7 DEGREES
RBC # BLD AUTO: 4.06 10E6/UL (ref 3.8–5.2)
RBC URINE: 3 /HPF
SODIUM SERPL-SCNC: 140 MMOL/L (ref 133–144)
SP GR UR STRIP: 1.01 (ref 1–1.03)
SYSTOLIC BLOOD PRESSURE - MUSE: NORMAL MMHG
T AXIS - MUSE: 45 DEGREES
UROBILINOGEN UR STRIP-MCNC: NORMAL MG/DL
VENTRICULAR RATE- MUSE: 70 BPM
WBC # BLD AUTO: 10.7 10E3/UL (ref 4–11)
WBC URINE: 3 /HPF

## 2022-01-01 PROCEDURE — 85025 COMPLETE CBC W/AUTO DIFF WBC: CPT | Performed by: EMERGENCY MEDICINE

## 2022-01-01 PROCEDURE — 80048 BASIC METABOLIC PNL TOTAL CA: CPT | Performed by: EMERGENCY MEDICINE

## 2022-01-01 PROCEDURE — 93005 ELECTROCARDIOGRAM TRACING: CPT

## 2022-01-01 PROCEDURE — 99285 EMERGENCY DEPT VISIT HI MDM: CPT | Mod: 25

## 2022-01-01 PROCEDURE — 81001 URINALYSIS AUTO W/SCOPE: CPT | Performed by: EMERGENCY MEDICINE

## 2022-01-01 PROCEDURE — 36415 COLL VENOUS BLD VENIPUNCTURE: CPT | Performed by: EMERGENCY MEDICINE

## 2022-01-01 PROCEDURE — 70450 CT HEAD/BRAIN W/O DYE: CPT

## 2022-01-01 ASSESSMENT — ACTIVITIES OF DAILY LIVING (ADL): ADLS_ACUITY_SCORE: 35

## 2022-01-08 ENCOUNTER — HEALTH MAINTENANCE LETTER (OUTPATIENT)
Age: 77
End: 2022-01-08

## 2022-01-12 ENCOUNTER — HOSPITAL ENCOUNTER (EMERGENCY)
Facility: CLINIC | Age: 77
Discharge: HOME | End: 2022-01-13
Attending: EMERGENCY MEDICINE | Admitting: EMERGENCY MEDICINE
Payer: COMMERCIAL

## 2022-01-12 ENCOUNTER — APPOINTMENT (OUTPATIENT)
Dept: CT IMAGING | Facility: CLINIC | Age: 77
End: 2022-01-12
Attending: EMERGENCY MEDICINE
Payer: COMMERCIAL

## 2022-01-12 DIAGNOSIS — S01.01XA LACERATION OF SCALP, INITIAL ENCOUNTER: ICD-10-CM

## 2022-01-12 DIAGNOSIS — S09.90XA INJURY OF HEAD, INITIAL ENCOUNTER: ICD-10-CM

## 2022-01-12 LAB
HOLD SPECIMEN: NORMAL

## 2022-01-12 PROCEDURE — 72125 CT NECK SPINE W/O DYE: CPT

## 2022-01-12 PROCEDURE — 12001 RPR S/N/AX/GEN/TRNK 2.5CM/<: CPT

## 2022-01-12 PROCEDURE — 70450 CT HEAD/BRAIN W/O DYE: CPT

## 2022-01-12 PROCEDURE — 99284 EMERGENCY DEPT VISIT MOD MDM: CPT | Mod: 25

## 2022-01-13 VITALS
DIASTOLIC BLOOD PRESSURE: 65 MMHG | HEART RATE: 61 BPM | TEMPERATURE: 98.3 F | OXYGEN SATURATION: 100 % | RESPIRATION RATE: 16 BRPM | SYSTOLIC BLOOD PRESSURE: 133 MMHG

## 2022-01-13 NOTE — DISCHARGE INSTRUCTIONS
Discharge Instructions  Laceration (Cut)    You were seen today for a laceration (cut).  Your provider examined your laceration for any problems such a buried foreign body (like glass, a splinter, or gravel), or injury to blood vessels, tendons, and nerves.  Your provider may have also rinsed and/or scrubbed your laceration to help prevent an infection. It may not be possible to find all problems with your laceration on the first visit; occasionally foreign bodies or a tendon injury can go undetected.    Your laceration may have been closed in one of several ways:  No closure: many wounds will heal just fine without closure.  Stitches: regular stitches that require removal.  Staples: skin staples are often used in the scalp/head.  Wound adhesive (glue): skin glue can be used for certain lacerations and doesn t require removal.  Wound strips (aka Butterfly bandages or steri-strips): these are bandages that help to close a wound.  Absorbable stitches:  dissolving  stitches that go away on their own and usually don t require removal.    A small percentage of wounds will develop an infection regardless of how well the wound is cared for. Antibiotics are generally not indicated to prevent an infection so are only given for a small number of high-risk wounds. Some lacerations are too high risk to close, and are left open to heal because closure can increase the likelihood that an infection will develop.    Remember that all lacerations, no matter how expertly repaired, will cause scarring. We consider many factors, techniques, and materials, in our efforts to provide the best possible cosmetic outcome.    Generally, every Emergency Department visit should have a follow-up clinic visit with either a primary or a specialty clinic/provider. Please follow-up as instructed by your emergency provider today.     Return to the Emergency Department right away if:  You have more redness, swelling, pain, drainage (pus), a bad smell,  or red streaking from your laceration as these symptoms could indicate an infection.  You have a fever of 100.4 F or more.  You have bleeding that you cannot stop at home. If your cut starts to bleed, hold pressure on the bleeding area with a clean cloth or put pressure over the bandage.  If the bleeding does not stop after using constant pressure for 30 minutes, you should return to the Emergency Department for further treatment.  An area past the laceration is cool, pale, or blue compared with the other side, or has a slower return of color when squeezed.  Your dressing seems too tight or starts to get uncomfortable or painful. For children, signs of a problem might be irritability or restlessness.  You have loss of normal function or use of an area, such as being unable to straighten or bend a finger normally.  You have a numb area past the laceration.    Return to the Emergency Department or see your regular provider if:  The laceration starts to come open.   You have something coming out of the cut or a feeling that there is something in the laceration.  Your wound will not heal, or keeps breaking open. There can always be glass, wood, dirt or other things in any wound.  They will not always show up, even on x-rays.  If a wound does not heal, this may be why, and it is important to follow-up with your regular provider.    Home Care:  Take your dressing off in 12-24 hours, or as instructed by your provider, to check your laceration. Remove the dressing sooner if it seems too tight or painful, or if it is getting numb, tingly, or pale past the dressing.  Gently wash your laceration 1-2 times daily with clean water and mild soap. It is okay to shower or run clean water over the laceration, but do not let the laceration soak in water (no swimming).  If your laceration was closed with wound adhesive or strips: pat it dry and leave it open to the air. For all other repairs: after you wash your laceration, or at least  2 times a day, apply antibiotic ointment (such as Neosporin  or Bacitracin ) to the laceration, then cover it with a Band-Aid  or gauze.  Keep the laceration clean. Wear gloves or other protective clothing if you are around dirt.    Follow-up for removal:  If your wound was closed with staples or regular stitches, they need to be removed according to the instructions and timeline specified by your provider today.  If your wound was closed with absorbable ( dissolving ) sutures, they should fall out, dissolve, or not be visible in about one week. If they are still visible, then they should be removed according to the instructions and timeline specified by your provider today.    Scars:  To help minimize scarring:  Wear sunscreen over the healed laceration when out in the sun.  Massage the area regularly once healed.  You may apply Vitamin E to the healed wound.  Wait. Scars improve in appearance over months and years.    If you were given a prescription for medicine here today, be sure to read all of the information (including the package insert) that comes with your prescription.  This will include important information about the medicine, its side effects, and any warnings that you need to know about.  The pharmacist who fills the prescription can provide more information and answer questions you may have about the medicine.  If you have questions or concerns that the pharmacist cannot address, please call or return to the Emergency Department.       Remember that you can always come back to the Emergency Department if you are not able to see your regular provider in the amount of time listed above, if you get any new symptoms, or if there is anything that worries you.  Neosporin or bacitracin twice daily to scalp laceration for 2 days and then Vaseline or Aquaphor once daily thereafter until staple removal

## 2022-01-13 NOTE — ED NOTES
Attempting to call [facility] but only got answering machine. Left message to call.

## 2022-01-13 NOTE — ED NOTES
Called cell phone #[phone] that called dispatch. The person who answered said he would call back with contact information.

## 2022-01-13 NOTE — ED NOTES
Bed: ED11  Expected date: 1/12/22  Expected time: 8:20 PM  Means of arrival: Ambulance  Comments:  HEMS 425 76F fall, head lac

## 2022-01-13 NOTE — ED NOTES
Spoke with staff at NH, Duy at #[phone]. Also spoke with Nursing director, Mckinley at #[phone] with report. Will call for transport.

## 2022-01-13 NOTE — ED PROVIDER NOTES
History   Chief Complaint:  Fall       History limited by: dementia.      Loly Oliveira is a 76 year old female with history of Alzheimer's dementia who presents with head injury after a fall. Per EMS, the patient fell off her bed this evening; unwitnessed. She hit her head on the end table. Unknown loss of consciousness. Baseline mental status. No blood thinners. Normal blood sugar per EMS. Her last tetanus shot in September 2021.      Review of Systems   Unable to perform ROS: Dementia       Allergies:  Aricept      Medications:  Namenda  Exelon  Lexapro  Seroquel    Past Medical History:     Alzheimer's dementia  Bell's palsy  Chondromalacia of patella  Diethylstilbestrol exposure as fetus   Dementia   Diverticulitis   Esophageal reflux  Osteopenia  Raynaud disease  Restless leg syndrome  Rosacea  Tinnitus   Trochanteric bursitis       Past Surgical History:    Anterior Vesicourethropexy  Right arthroscopic surgery   Right shoulder decompression  Closed manipulation, left shoulder   Abdominal hysterectomy   D&C  Repair rotator cuff  Hernia repair, umbilical  Salpingo oophorectomy   Tonsillectomy & Adenoidectomy      Family History:    Father: lung cancer, alcohol, depression  Brother: CAD  Mother:MVR, diverticulitis, Cerebrovascular disease, osteoporosis    Social History:  The patient presents by herself.    Physical Exam     Patient Vitals for the past 24 hrs:   BP Temp Temp src Pulse Resp SpO2   01/12/22 2037 121/59 98.3  F (36.8  C) Temporal 63 16 98 %       Physical Exam    General: Alert and cooperative with exam. Patient in mild distress. Baseline mentation with history of dementia.  Head:  Right parietal scalp laceration.  Eyes:  No scleral icterus, PERRL  ENT:  The external nose and ears are normal. No oral trauma on exam  Neck:  Normal range of motion without rigidity.  CV:  Regular rate and rhythm  Resp:  Breath sounds are clear bilaterally    Non-labored, no retractions or accessory muscle  use  GI:  Abdomen is soft, no distension, no tenderness. No peritoneal signs  MS:  No lower extremity edema   Skin:  Warm and dry, No rash or lesions noted.  Neuro: Oriented to person only, not time or place. No gross motor deficits.      Emergency Department Course   Imaging:  Head CT w/o contrast   Final Result   IMPRESSION:   HEAD CT:   1.  Age-related changes, as above, with no acute intracranial abnormality.      CERVICAL SPINE CT:   1.  No CT evidence for acute fracture or post traumatic subluxation.   2.  Osteopenia with degenerative change as above. No high-grade spinal canal or neural foraminal stenosis.      Cervical spine CT w/o contrast   Final Result   IMPRESSION:   HEAD CT:   1.  Age-related changes, as above, with no acute intracranial abnormality.      CERVICAL SPINE CT:   1.  No CT evidence for acute fracture or post traumatic subluxation.   2.  Osteopenia with degenerative change as above. No high-grade spinal canal or neural foraminal stenosis.        Report per radiology      Procedures    Laceration Repair        LACERATION:  A simple clean 1.5 cm laceration.      LOCATION:  Right parietal area      FUNCTION:  Distally sensation and circulation are intact.      PREPARATION:  Irrigation and Scrubbing with Shur Clens      DEBRIDEMENT:  no debridement      CLOSURE:  Wound was closed with 2 Staples      Emergency Department Course:    Reviewed:  I reviewed nursing notes, vitals, past medical history and Care Everywhere    Assessments:  2035 I obtained history and examined the patient as noted above.   2116 I rechecked the patient and performed procedure as shown above.     Disposition:  The patient was discharged to home.     Impression & Plan     Medical Decision Making:  Loly Oliveira is a 76 year old female presented with a laceration on parietal scalp.  No CT evidence for acute fracture or intracranial pathology .Tetanus is up to date. The wound was carefully evaluated and explored.  The  laceration was closed with as noted above.  There is no evidence of muscular, tendon, or bony damage with this laceration.  No signs of foreign body.  Possible complications (infection, scarring) and reasons for immediate re-evaluation were provided. Follow up with primary care will be indicated for removal as noted in the discharge section in 7 days time.  Discharged with EMS back to skilled care facility given dementia and need for monitoring for safety    Diagnosis:    ICD-10-CM    1. Laceration of scalp, initial encounter  S01.01XA    2. Injury of head, initial encounter  S09.90XA        Scribe Disclosure:  I, Alonzo Medina, am serving as a scribe at 8:35 PM on 1/12/2022 to document services personally performed by Matt Zelaya DO  based on my observations and the provider's statements to me.              Matt Zelaya,   01/13/22 0105

## 2022-01-24 ENCOUNTER — MEDICAL CORRESPONDENCE (OUTPATIENT)
Dept: HEALTH INFORMATION MANAGEMENT | Facility: CLINIC | Age: 77
End: 2022-01-24

## 2022-07-20 ENCOUNTER — LAB REQUISITION (OUTPATIENT)
Dept: LAB | Facility: CLINIC | Age: 77
End: 2022-07-20
Payer: COMMERCIAL

## 2022-07-20 DIAGNOSIS — B34.2 CORONAVIRUS INFECTION, UNSPECIFIED: ICD-10-CM

## 2022-07-21 LAB
ANION GAP SERPL CALCULATED.3IONS-SCNC: 11 MMOL/L (ref 7–15)
BUN SERPL-MCNC: 10.4 MG/DL (ref 8–23)
CALCIUM SERPL-MCNC: 9.2 MG/DL (ref 8.8–10.2)
CHLORIDE SERPL-SCNC: 102 MMOL/L (ref 98–107)
CREAT SERPL-MCNC: 0.6 MG/DL (ref 0.51–0.95)
DEPRECATED HCO3 PLAS-SCNC: 28 MMOL/L (ref 22–29)
GFR SERPL CREATININE-BSD FRML MDRD: >90 ML/MIN/1.73M2
GLUCOSE SERPL-MCNC: 103 MG/DL (ref 70–99)
POTASSIUM SERPL-SCNC: 4.1 MMOL/L (ref 3.4–5.3)
SODIUM SERPL-SCNC: 141 MMOL/L (ref 136–145)

## 2022-07-21 PROCEDURE — 36415 COLL VENOUS BLD VENIPUNCTURE: CPT | Mod: ORL | Performed by: FAMILY MEDICINE

## 2022-07-21 PROCEDURE — P9604 ONE-WAY ALLOW PRORATED TRIP: HCPCS | Mod: ORL | Performed by: FAMILY MEDICINE

## 2022-07-21 PROCEDURE — 80048 BASIC METABOLIC PNL TOTAL CA: CPT | Mod: ORL | Performed by: FAMILY MEDICINE

## 2022-09-15 ENCOUNTER — MEDICAL CORRESPONDENCE (OUTPATIENT)
Dept: HEALTH INFORMATION MANAGEMENT | Facility: CLINIC | Age: 77
End: 2022-09-15

## 2022-10-04 PROBLEM — F02.818 ALZHEIMER'S DEMENTIA WITH BEHAVIORAL DISTURBANCE (H): Status: ACTIVE | Noted: 2020-02-05

## 2022-10-12 ENCOUNTER — MEDICAL CORRESPONDENCE (OUTPATIENT)
Dept: HEALTH INFORMATION MANAGEMENT | Facility: CLINIC | Age: 77
End: 2022-10-12

## 2022-11-03 NOTE — ED NOTES
Pt discharged in care of  who stated he felt comfortable bringing her back to assisted living.  Discharge paper work reviewed with .

## 2022-11-03 NOTE — ED PROVIDER NOTES
Visit Date: 11/03/2022    CHIEF COMPLAINT:  Shaking.    HISTORY OF PRESENT ILLNESS:  This is a 76-year-old woman who presents to the ED by ambulance after she was being helped to the bathroom today and had whole body shaking with some diminished consciousness during this time.  She did not fall, but was helped to the ground, and then a call for the ambulance was made.  She was no longer having this activity when the paramedics got there.  She did not appear to have any incontinence, but she did seem a bit confused to the paramedics.  The patient has never had a seizure before, and there has been no history of head trauma.    PAST MEDICAL HISTORY:  Includes restless legs, osteoarthritis, reflux disease, and Alzheimer's with behavioral disturbances.    MEDICATIONS:  Exelon, Seroquel, Namenda and Lexapro.    ALLERGIES:  ARICEPT.    FAMILY AND SOCIAL HISTORY:  She is in a memory care.    REVIEW OF SYSTEMS:  Noted, all other systems negative.    PHYSICAL EXAMINATION:    GENERAL:  An elderly white female, supine, sitting very quietly.    HEAD AND NECK:  Pupils are equal and reactive.  She does not do extraocular movements.  Neck and head reveal no tenderness on palpation.  Oropharynx is clear.  LUNGS:  Clear.  CHEST:  Chest wall nontender.  HEART:  Regular without murmurs.  ABDOMEN:  Soft, no tenderness or masses.  MUSCULOSKELETAL:  No swelling, no edema.  SKIN:  No rash.  NEUROLOGIC:  Awake and alert, nonverbal, although does report saying her name.  Unable to determine orientation.  Motor while supine is very minimal.  Sensation is intact to light touch with upgoing toes bilaterally.  Gait not tested.    EMERGENCY DEPARTMENT COURSE:  The patient had a CT scan of the head, which shows no acute abnormalities.  EKG was done showing a normal sinus rhythm with no ischemic or hypertrophic changes.     LABORATORY:  Urine: 3 red cells, 3 white cells.  BMP is normal.  White count 10.7, hemoglobin 13.3, and platelet count is  183.    Given the shaking and the possible altered mental status, she could have experienced a seizure.  She has significant memory problems.  CAT scan did not show any acute bleed or tumor.  EKG shows no acute arrhythmia and labs and urine are essentially unremarkable.     At this point, I have recommended following up with her primary and also following up with Neurology.  I have referred her to Dr. Marie.  An EEG may be helpful if there are recurrent symptoms and the need for antiepileptic measures.  The patient does not appear to be septic.    IMPRESSION:  Altered level of consciousness, uncertain etiology.    Jamie Garrido MD        D: 2022   T: 2022   MT: PRINCESS    Name:     PINEDA CLANCY  MRN:      [id number]        Account:    [id number]   :      1945           Visit Date: 2022     Document: [id number]

## 2022-11-03 NOTE — ED TRIAGE NOTES
Pt presents to the ED via EMS with a 2-3 minute of full body shaking that last 2-3 minutes while staff was getting her up to the toilet.  Pt assisted to the ground by staff.  Per EMS pt has severe dementia and is verbally limited to short one word statements.     Triage Assessment     Row Name 11/03/22 0817       Triage Assessment (Adult)    Airway WDL WDL       Respiratory WDL    Respiratory WDL WDL       Skin Circulation/Temperature WDL    Skin Circulation/Temperature WDL WDL       Cardiac WDL    Cardiac WDL WDL       Peripheral/Neurovascular WDL    Peripheral Neurovascular WDL WDL       Cognitive/Neuro/Behavioral WDL    Cognitive/Neuro/Behavioral WDL WDL

## 2022-11-03 NOTE — ED NOTES
Bed: ED06  Expected date: 11/3/22  Expected time: 7:58 AM  Means of arrival: Ambulance  Comments:  062- 93F.  First time seizure.  ETA 20

## 2023-01-01 ENCOUNTER — HOSPITAL ENCOUNTER (EMERGENCY)
Facility: CLINIC | Age: 78
Discharge: HOME | End: 2023-03-06
Attending: EMERGENCY MEDICINE | Admitting: EMERGENCY MEDICINE
Payer: COMMERCIAL

## 2023-01-01 ENCOUNTER — APPOINTMENT (OUTPATIENT)
Dept: CT IMAGING | Facility: CLINIC | Age: 78
End: 2023-01-01
Attending: EMERGENCY MEDICINE
Payer: COMMERCIAL

## 2023-01-01 ENCOUNTER — APPOINTMENT (OUTPATIENT)
Dept: CT IMAGING | Facility: CLINIC | Age: 78
End: 2023-01-01
Attending: PHYSICIAN ASSISTANT
Payer: COMMERCIAL

## 2023-01-01 ENCOUNTER — HOSPITAL ENCOUNTER (EMERGENCY)
Facility: CLINIC | Age: 78
Discharge: NURSING, REHAB OR LONG-TERM CARE | End: 2023-01-24
Attending: EMERGENCY MEDICINE | Admitting: EMERGENCY MEDICINE
Payer: COMMERCIAL

## 2023-01-01 ENCOUNTER — APPOINTMENT (OUTPATIENT)
Dept: GENERAL RADIOLOGY | Facility: CLINIC | Age: 78
End: 2023-01-01
Attending: EMERGENCY MEDICINE
Payer: COMMERCIAL

## 2023-01-01 ENCOUNTER — APPOINTMENT (OUTPATIENT)
Dept: GENERAL RADIOLOGY | Facility: CLINIC | Age: 78
End: 2023-01-01
Attending: PHYSICIAN ASSISTANT
Payer: COMMERCIAL

## 2023-01-01 ENCOUNTER — HOSPITAL ENCOUNTER (EMERGENCY)
Facility: CLINIC | Age: 78
Discharge: HOME | End: 2023-01-31
Attending: EMERGENCY MEDICINE | Admitting: EMERGENCY MEDICINE
Payer: COMMERCIAL

## 2023-01-01 ENCOUNTER — HEALTH MAINTENANCE LETTER (OUTPATIENT)
Age: 78
End: 2023-01-01

## 2023-01-01 ENCOUNTER — DOCUMENTATION ONLY (OUTPATIENT)
Dept: OTHER | Facility: CLINIC | Age: 78
End: 2023-01-01
Payer: COMMERCIAL

## 2023-01-01 ENCOUNTER — HOSPITAL ENCOUNTER (EMERGENCY)
Facility: CLINIC | Age: 78
Discharge: HOME | End: 2023-01-26
Attending: EMERGENCY MEDICINE | Admitting: EMERGENCY MEDICINE
Payer: COMMERCIAL

## 2023-01-01 ENCOUNTER — HOSPITAL ENCOUNTER (EMERGENCY)
Facility: CLINIC | Age: 78
Discharge: HOME | End: 2023-02-17
Attending: PHYSICIAN ASSISTANT | Admitting: PHYSICIAN ASSISTANT
Payer: COMMERCIAL

## 2023-01-01 VITALS
TEMPERATURE: 97.7 F | SYSTOLIC BLOOD PRESSURE: 129 MMHG | RESPIRATION RATE: 12 BRPM | OXYGEN SATURATION: 93 % | HEART RATE: 85 BPM | DIASTOLIC BLOOD PRESSURE: 67 MMHG

## 2023-01-01 VITALS
SYSTOLIC BLOOD PRESSURE: 115 MMHG | TEMPERATURE: 97.4 F | OXYGEN SATURATION: 90 % | DIASTOLIC BLOOD PRESSURE: 63 MMHG | HEART RATE: 68 BPM | RESPIRATION RATE: 14 BRPM

## 2023-01-01 VITALS
DIASTOLIC BLOOD PRESSURE: 71 MMHG | RESPIRATION RATE: 17 BRPM | HEART RATE: 67 BPM | OXYGEN SATURATION: 96 % | TEMPERATURE: 98.8 F | SYSTOLIC BLOOD PRESSURE: 153 MMHG

## 2023-01-01 VITALS
TEMPERATURE: 96.8 F | RESPIRATION RATE: 15 BRPM | SYSTOLIC BLOOD PRESSURE: 134 MMHG | OXYGEN SATURATION: 99 % | HEART RATE: 60 BPM | DIASTOLIC BLOOD PRESSURE: 63 MMHG

## 2023-01-01 VITALS
HEART RATE: 60 BPM | DIASTOLIC BLOOD PRESSURE: 55 MMHG | RESPIRATION RATE: 20 BRPM | SYSTOLIC BLOOD PRESSURE: 104 MMHG | OXYGEN SATURATION: 98 % | TEMPERATURE: 98.3 F

## 2023-01-01 DIAGNOSIS — F02.C0 SEVERE ALZHEIMER'S DEMENTIA, UNSPECIFIED TIMING OF DEMENTIA ONSET, UNSPECIFIED WHETHER BEHAVIORAL, PSYCHOTIC, OR MOOD DISTURBANCE OR ANXIETY (H): ICD-10-CM

## 2023-01-01 DIAGNOSIS — N39.0 URINARY TRACT INFECTION WITHOUT HEMATURIA, SITE UNSPECIFIED: ICD-10-CM
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
## 2023-01-01 DIAGNOSIS — K59.00 CONSTIPATION, UNSPECIFIED CONSTIPATION TYPE: ICD-10-CM

## 2023-01-01 DIAGNOSIS — R68.89 NECK PROBLEM: ICD-10-CM

## 2023-01-01 DIAGNOSIS — M62.81 MUSCLE WEAKNESS (GENERALIZED): ICD-10-CM

## 2023-01-01 DIAGNOSIS — I31.39 PERICARDIAL EFFUSION: ICD-10-CM

## 2023-01-01 DIAGNOSIS — S22.000A COMPRESSION FRACTURE OF BODY OF THORACIC VERTEBRA (H): ICD-10-CM

## 2023-01-01 DIAGNOSIS — R26.2 TROUBLE WALKING: ICD-10-CM

## 2023-01-01 DIAGNOSIS — N30.00 ACUTE CYSTITIS WITHOUT HEMATURIA: ICD-10-CM

## 2023-01-01 DIAGNOSIS — R41.82 ALTERED MENTAL STATUS, UNSPECIFIED ALTERED MENTAL STATUS TYPE: ICD-10-CM

## 2023-01-01 DIAGNOSIS — F03.90 DEMENTIA, UNSPECIFIED DEMENTIA SEVERITY, UNSPECIFIED DEMENTIA TYPE, UNSPECIFIED WHETHER BEHAVIORAL, PSYCHOTIC, OR MOOD DISTURBANCE OR ANXIETY (H): ICD-10-CM

## 2023-01-01 DIAGNOSIS — S09.90XA INJURY OF HEAD, INITIAL ENCOUNTER: ICD-10-CM

## 2023-01-01 DIAGNOSIS — G30.9 SEVERE ALZHEIMER'S DEMENTIA, UNSPECIFIED TIMING OF DEMENTIA ONSET, UNSPECIFIED WHETHER BEHAVIORAL, PSYCHOTIC, OR MOOD DISTURBANCE OR ANXIETY (H): ICD-10-CM

## 2023-01-01 DIAGNOSIS — R74.02 ELEVATED SERUM LACTATE DEHYDROGENASE: ICD-10-CM

## 2023-01-01 LAB
ALBUMIN SERPL BCG-MCNC: 3.4 G/DL (ref 3.5–5.2)
ALBUMIN SERPL BCG-MCNC: 3.7 G/DL (ref 3.5–5.2)
ALBUMIN SERPL BCG-MCNC: 4.1 G/DL (ref 3.5–5.2)
ALBUMIN UR-MCNC: 20 MG/DL
ALBUMIN UR-MCNC: 200 MG/DL
ALP SERPL-CCNC: 68 U/L (ref 35–104)
ALP SERPL-CCNC: 85 U/L (ref 35–104)
ALP SERPL-CCNC: 94 U/L (ref 35–104)
ALT SERPL W P-5'-P-CCNC: 11 U/L (ref 10–35)
ALT SERPL W P-5'-P-CCNC: 11 U/L (ref 10–35)
ALT SERPL W P-5'-P-CCNC: 6 U/L (ref 10–35)
ANION GAP SERPL CALCULATED.3IONS-SCNC: 10 MMOL/L (ref 7–15)
ANION GAP SERPL CALCULATED.3IONS-SCNC: 7 MMOL/L (ref 7–15)
ANION GAP SERPL CALCULATED.3IONS-SCNC: 8 MMOL/L (ref 7–15)
ANION GAP SERPL CALCULATED.3IONS-SCNC: 9 MMOL/L (ref 7–15)
APPEARANCE UR: ABNORMAL
APPEARANCE UR: CLEAR
AST SERPL W P-5'-P-CCNC: 21 U/L (ref 10–35)
AST SERPL W P-5'-P-CCNC: 26 U/L (ref 10–35)
AST SERPL W P-5'-P-CCNC: 36 U/L (ref 10–35)
ATRIAL RATE - MUSE: 57 BPM
ATRIAL RATE - MUSE: 78 BPM
BACTERIA #/AREA URNS HPF: ABNORMAL /HPF
BACTERIA #/AREA URNS HPF: ABNORMAL /HPF
BACTERIA BLD CULT: NO GROWTH
BACTERIA BLD CULT: NO GROWTH
BACTERIA UR CULT: ABNORMAL
BACTERIA UR CULT: NO GROWTH
BASOPHILS # BLD AUTO: 0 10E3/UL (ref 0–0.2)
BASOPHILS # BLD AUTO: 0.1 10E3/UL (ref 0–0.2)
BASOPHILS NFR BLD AUTO: 0 %
BASOPHILS NFR BLD AUTO: 1 %
BILIRUB SERPL-MCNC: 0.2 MG/DL
BILIRUB SERPL-MCNC: 0.3 MG/DL
BILIRUB SERPL-MCNC: 0.3 MG/DL
BILIRUB UR QL STRIP: NEGATIVE
BILIRUB UR QL STRIP: NEGATIVE
BUN SERPL-MCNC: 10.5 MG/DL (ref 8–23)
BUN SERPL-MCNC: 11.5 MG/DL (ref 8–23)
BUN SERPL-MCNC: 14 MG/DL (ref 8–23)
BUN SERPL-MCNC: 8.3 MG/DL (ref 8–23)
CALCIUM SERPL-MCNC: 8.6 MG/DL (ref 8.8–10.2)
CALCIUM SERPL-MCNC: 8.7 MG/DL (ref 8.8–10.2)
CALCIUM SERPL-MCNC: 8.7 MG/DL (ref 8.8–10.2)
CALCIUM SERPL-MCNC: 9.1 MG/DL (ref 8.8–10.2)
CAOX CRY #/AREA URNS HPF: ABNORMAL /HPF
CHLORIDE SERPL-SCNC: 102 MMOL/L (ref 98–107)
CHLORIDE SERPL-SCNC: 104 MMOL/L (ref 98–107)
CHLORIDE SERPL-SCNC: 105 MMOL/L (ref 98–107)
CHLORIDE SERPL-SCNC: 107 MMOL/L (ref 98–107)
COLOR UR AUTO: YELLOW
COLOR UR AUTO: YELLOW
CREAT SERPL-MCNC: 0.6 MG/DL (ref 0.51–0.95)
CREAT SERPL-MCNC: 0.6 MG/DL (ref 0.51–0.95)
CREAT SERPL-MCNC: 0.62 MG/DL (ref 0.51–0.95)
CREAT SERPL-MCNC: 0.7 MG/DL (ref 0.51–0.95)
DEPRECATED HCO3 PLAS-SCNC: 24 MMOL/L (ref 22–29)
DEPRECATED HCO3 PLAS-SCNC: 27 MMOL/L (ref 22–29)
DEPRECATED HCO3 PLAS-SCNC: 29 MMOL/L (ref 22–29)
DEPRECATED HCO3 PLAS-SCNC: 30 MMOL/L (ref 22–29)
DIASTOLIC BLOOD PRESSURE - MUSE: NORMAL MMHG
DIASTOLIC BLOOD PRESSURE - MUSE: NORMAL MMHG
EOSINOPHIL # BLD AUTO: 0 10E3/UL (ref 0–0.7)
EOSINOPHIL # BLD AUTO: 0.1 10E3/UL (ref 0–0.7)
EOSINOPHIL NFR BLD AUTO: 0 %
EOSINOPHIL NFR BLD AUTO: 1 %
ERYTHROCYTE [DISTWIDTH] IN BLOOD BY AUTOMATED COUNT: 12.3 % (ref 10–15)
ERYTHROCYTE [DISTWIDTH] IN BLOOD BY AUTOMATED COUNT: 12.3 % (ref 10–15)
ERYTHROCYTE [DISTWIDTH] IN BLOOD BY AUTOMATED COUNT: 12.5 % (ref 10–15)
ERYTHROCYTE [DISTWIDTH] IN BLOOD BY AUTOMATED COUNT: 12.6 % (ref 10–15)
FLUAV RNA SPEC QL NAA+PROBE: NEGATIVE
FLUAV RNA SPEC QL NAA+PROBE: NEGATIVE
FLUBV RNA RESP QL NAA+PROBE: NEGATIVE
FLUBV RNA RESP QL NAA+PROBE: NEGATIVE
GFR SERPL CREATININE-BSD FRML MDRD: 87 ML/MIN/1.73M2
GFR SERPL CREATININE-BSD FRML MDRD: 90 ML/MIN/1.73M2
GFR SERPL CREATININE-BSD FRML MDRD: >90 ML/MIN/1.73M2
GFR SERPL CREATININE-BSD FRML MDRD: >90 ML/MIN/1.73M2
GLUCOSE SERPL-MCNC: 106 MG/DL (ref 70–99)
GLUCOSE SERPL-MCNC: 125 MG/DL (ref 70–99)
GLUCOSE SERPL-MCNC: 97 MG/DL (ref 70–99)
GLUCOSE SERPL-MCNC: 99 MG/DL (ref 70–99)
GLUCOSE UR STRIP-MCNC: NEGATIVE MG/DL
GLUCOSE UR STRIP-MCNC: NEGATIVE MG/DL
HCO3 BLDV-SCNC: 32 MMOL/L (ref 21–28)
HCT VFR BLD AUTO: 37.1 % (ref 35–47)
HCT VFR BLD AUTO: 37.4 % (ref 35–47)
HCT VFR BLD AUTO: 41.7 % (ref 35–47)
HCT VFR BLD AUTO: 42.1 % (ref 35–47)
HGB BLD-MCNC: 12 G/DL (ref 11.7–15.7)
HGB BLD-MCNC: 12.2 G/DL (ref 11.7–15.7)
HGB BLD-MCNC: 13.1 G/DL (ref 11.7–15.7)
HGB BLD-MCNC: 13.7 G/DL (ref 11.7–15.7)
HGB UR QL STRIP: ABNORMAL
HGB UR QL STRIP: ABNORMAL
HOLD SPECIMEN: NORMAL
IMM GRANULOCYTES # BLD: 0 10E3/UL
IMM GRANULOCYTES NFR BLD: 0 %
INTERPRETATION ECG - MUSE: NORMAL
INTERPRETATION ECG - MUSE: NORMAL
KETONES UR STRIP-MCNC: ABNORMAL MG/DL
KETONES UR STRIP-MCNC: NEGATIVE MG/DL
LACTATE BLD-SCNC: 1.7 MMOL/L
LACTATE SERPL-SCNC: 0.6 MMOL/L (ref 0.7–2)
LACTATE SERPL-SCNC: 0.7 MMOL/L (ref 0.7–2)
LACTATE SERPL-SCNC: 1.8 MMOL/L (ref 0.7–2)
LACTATE SERPL-SCNC: 2.5 MMOL/L (ref 0.7–2)
LEUKOCYTE ESTERASE UR QL STRIP: ABNORMAL
LEUKOCYTE ESTERASE UR QL STRIP: ABNORMAL
LYMPHOCYTES # BLD AUTO: 0.9 10E3/UL (ref 0.8–5.3)
LYMPHOCYTES # BLD AUTO: 1.1 10E3/UL (ref 0.8–5.3)
LYMPHOCYTES # BLD AUTO: 1.3 10E3/UL (ref 0.8–5.3)
LYMPHOCYTES # BLD AUTO: 1.6 10E3/UL (ref 0.8–5.3)
LYMPHOCYTES NFR BLD AUTO: 13 %
LYMPHOCYTES NFR BLD AUTO: 16 %
LYMPHOCYTES NFR BLD AUTO: 22 %
LYMPHOCYTES NFR BLD AUTO: 9 %
MCH RBC QN AUTO: 31.8 PG (ref 26.5–33)
MCH RBC QN AUTO: 32.4 PG (ref 26.5–33)
MCH RBC QN AUTO: 32.6 PG (ref 26.5–33)
MCH RBC QN AUTO: 32.8 PG (ref 26.5–33)
MCHC RBC AUTO-ENTMCNC: 31.1 G/DL (ref 31.5–36.5)
MCHC RBC AUTO-ENTMCNC: 32.3 G/DL (ref 31.5–36.5)
MCHC RBC AUTO-ENTMCNC: 32.6 G/DL (ref 31.5–36.5)
MCHC RBC AUTO-ENTMCNC: 32.9 G/DL (ref 31.5–36.5)
MCV RBC AUTO: 100 FL (ref 78–100)
MCV RBC AUTO: 105 FL (ref 78–100)
MCV RBC AUTO: 98 FL (ref 78–100)
MCV RBC AUTO: 99 FL (ref 78–100)
MONOCYTES # BLD AUTO: 0.6 10E3/UL (ref 0–1.3)
MONOCYTES # BLD AUTO: 0.7 10E3/UL (ref 0–1.3)
MONOCYTES # BLD AUTO: 0.7 10E3/UL (ref 0–1.3)
MONOCYTES # BLD AUTO: 0.9 10E3/UL (ref 0–1.3)
MONOCYTES NFR BLD AUTO: 10 %
MONOCYTES NFR BLD AUTO: 11 %
MONOCYTES NFR BLD AUTO: 6 %
MONOCYTES NFR BLD AUTO: 9 %
MUCOUS THREADS #/AREA URNS LPF: PRESENT /LPF
MUCOUS THREADS #/AREA URNS LPF: PRESENT /LPF
NEUTROPHILS # BLD AUTO: 4.7 10E3/UL (ref 1.6–8.3)
NEUTROPHILS # BLD AUTO: 4.9 10E3/UL (ref 1.6–8.3)
NEUTROPHILS # BLD AUTO: 7.7 10E3/UL (ref 1.6–8.3)
NEUTROPHILS # BLD AUTO: 8.1 10E3/UL (ref 1.6–8.3)
NEUTROPHILS NFR BLD AUTO: 66 %
NEUTROPHILS NFR BLD AUTO: 72 %
NEUTROPHILS NFR BLD AUTO: 77 %
NEUTROPHILS NFR BLD AUTO: 85 %
NITRATE UR QL: NEGATIVE
NITRATE UR QL: POSITIVE
NRBC # BLD AUTO: 0 10E3/UL
NRBC BLD AUTO-RTO: 0 /100
P AXIS - MUSE: 17 DEGREES
P AXIS - MUSE: 42 DEGREES
PCO2 BLDV: 59 MM HG (ref 40–50)
PH BLDV: 7.34 [PH] (ref 7.32–7.43)
PH UR STRIP: 6 [PH] (ref 5–7)
PH UR STRIP: 6 [PH] (ref 5–7)
PLATELET # BLD AUTO: 151 10E3/UL (ref 150–450)
PLATELET # BLD AUTO: 181 10E3/UL (ref 150–450)
PLATELET # BLD AUTO: 194 10E3/UL (ref 150–450)
PLATELET # BLD AUTO: 227 10E3/UL (ref 150–450)
PO2 BLDV: 21 MM HG (ref 25–47)
POTASSIUM SERPL-SCNC: 3.7 MMOL/L (ref 3.4–5.3)
POTASSIUM SERPL-SCNC: 4.4 MMOL/L (ref 3.4–5.3)
POTASSIUM SERPL-SCNC: 4.4 MMOL/L (ref 3.4–5.3)
POTASSIUM SERPL-SCNC: 4.6 MMOL/L (ref 3.4–5.3)
PR INTERVAL - MUSE: 106 MS
PR INTERVAL - MUSE: 122 MS
PROT SERPL-MCNC: 5.9 G/DL (ref 6.4–8.3)
PROT SERPL-MCNC: 6 G/DL (ref 6.4–8.3)
PROT SERPL-MCNC: 6.5 G/DL (ref 6.4–8.3)
QRS DURATION - MUSE: 70 MS
QRS DURATION - MUSE: 72 MS
QT - MUSE: 372 MS
QT - MUSE: 424 MS
QTC - MUSE: 412 MS
QTC - MUSE: 424 MS
R AXIS - MUSE: -6 DEGREES
R AXIS - MUSE: 5 DEGREES
RBC # BLD AUTO: 3.76 10E6/UL (ref 3.8–5.2)
RBC # BLD AUTO: 3.77 10E6/UL (ref 3.8–5.2)
RBC # BLD AUTO: 4 10E6/UL (ref 3.8–5.2)
RBC # BLD AUTO: 4.2 10E6/UL (ref 3.8–5.2)
RBC URINE: 3 /HPF
RBC URINE: >182 /HPF
RSV RNA SPEC NAA+PROBE: NEGATIVE
RSV RNA SPEC NAA+PROBE: NEGATIVE
SAO2 % BLDV: 29 % (ref 94–100)
SARS-COV-2 RNA RESP QL NAA+PROBE: NEGATIVE
SARS-COV-2 RNA RESP QL NAA+PROBE: NEGATIVE
SODIUM SERPL-SCNC: 139 MMOL/L (ref 136–145)
SODIUM SERPL-SCNC: 140 MMOL/L (ref 136–145)
SODIUM SERPL-SCNC: 141 MMOL/L (ref 136–145)
SODIUM SERPL-SCNC: 142 MMOL/L (ref 136–145)
SP GR UR STRIP: 1.03 (ref 1–1.03)
SP GR UR STRIP: >1.035 (ref 1–1.03)
SQUAMOUS EPITHELIAL: <1 /HPF
SYSTOLIC BLOOD PRESSURE - MUSE: NORMAL MMHG
SYSTOLIC BLOOD PRESSURE - MUSE: NORMAL MMHG
T AXIS - MUSE: 22 DEGREES
T AXIS - MUSE: 48 DEGREES
TROPONIN T SERPL HS-MCNC: 8 NG/L
TROPONIN T SERPL HS-MCNC: 8 NG/L
UROBILINOGEN UR STRIP-MCNC: 2 MG/DL
UROBILINOGEN UR STRIP-MCNC: 3 MG/DL
VENTRICULAR RATE- MUSE: 57 BPM
VENTRICULAR RATE- MUSE: 78 BPM
WBC # BLD AUTO: 10 10E3/UL (ref 4–11)
WBC # BLD AUTO: 6.5 10E3/UL (ref 4–11)
WBC # BLD AUTO: 7.3 10E3/UL (ref 4–11)
WBC # BLD AUTO: 9.6 10E3/UL (ref 4–11)
WBC URINE: 27 /HPF
WBC URINE: >182 /HPF

## 2023-01-01 PROCEDURE — 258N000003 HC RX IP 258 OP 636: Performed by: PHYSICIAN ASSISTANT

## 2023-01-01 PROCEDURE — 81001 URINALYSIS AUTO W/SCOPE: CPT | Performed by: EMERGENCY MEDICINE

## 2023-01-01 PROCEDURE — C9803 HOPD COVID-19 SPEC COLLECT: HCPCS

## 2023-01-01 PROCEDURE — 80053 COMPREHEN METABOLIC PANEL: CPT | Performed by: EMERGENCY MEDICINE

## 2023-01-01 PROCEDURE — 99285 EMERGENCY DEPT VISIT HI MDM: CPT | Mod: 25

## 2023-01-01 PROCEDURE — 72128 CT CHEST SPINE W/O DYE: CPT

## 2023-01-01 PROCEDURE — 250N000011 HC RX IP 250 OP 636: Performed by: PHYSICIAN ASSISTANT

## 2023-01-01 PROCEDURE — C9803 HOPD COVID-19 SPEC COLLECT: HCPCS | Performed by: EMERGENCY MEDICINE

## 2023-01-01 PROCEDURE — 51798 US URINE CAPACITY MEASURE: CPT

## 2023-01-01 PROCEDURE — 96360 HYDRATION IV INFUSION INIT: CPT | Performed by: EMERGENCY MEDICINE

## 2023-01-01 PROCEDURE — 93005 ELECTROCARDIOGRAM TRACING: CPT

## 2023-01-01 PROCEDURE — 87637 SARSCOV2&INF A&B&RSV AMP PRB: CPT | Performed by: EMERGENCY MEDICINE

## 2023-01-01 PROCEDURE — 72125 CT NECK SPINE W/O DYE: CPT | Mod: 26 | Performed by: RADIOLOGY

## 2023-01-01 PROCEDURE — 87086 URINE CULTURE/COLONY COUNT: CPT | Performed by: EMERGENCY MEDICINE

## 2023-01-01 PROCEDURE — 85025 COMPLETE CBC W/AUTO DIFF WBC: CPT | Performed by: EMERGENCY MEDICINE

## 2023-01-01 PROCEDURE — 70450 CT HEAD/BRAIN W/O DYE: CPT

## 2023-01-01 PROCEDURE — 74177 CT ABD & PELVIS W/CONTRAST: CPT

## 2023-01-01 PROCEDURE — 72125 CT NECK SPINE W/O DYE: CPT

## 2023-01-01 PROCEDURE — 87086 URINE CULTURE/COLONY COUNT: CPT | Performed by: PHYSICIAN ASSISTANT

## 2023-01-01 PROCEDURE — 96360 HYDRATION IV INFUSION INIT: CPT | Mod: 59

## 2023-01-01 PROCEDURE — 80048 BASIC METABOLIC PNL TOTAL CA: CPT | Performed by: PHYSICIAN ASSISTANT

## 2023-01-01 PROCEDURE — 71046 X-RAY EXAM CHEST 2 VIEWS: CPT | Mod: 26 | Performed by: RADIOLOGY

## 2023-01-01 PROCEDURE — 99284 EMERGENCY DEPT VISIT MOD MDM: CPT | Mod: CS | Performed by: EMERGENCY MEDICINE

## 2023-01-01 PROCEDURE — 71045 X-RAY EXAM CHEST 1 VIEW: CPT

## 2023-01-01 PROCEDURE — 81001 URINALYSIS AUTO W/SCOPE: CPT | Performed by: PHYSICIAN ASSISTANT

## 2023-01-01 PROCEDURE — 36415 COLL VENOUS BLD VENIPUNCTURE: CPT | Performed by: PHYSICIAN ASSISTANT

## 2023-01-01 PROCEDURE — 85025 COMPLETE CBC W/AUTO DIFF WBC: CPT | Performed by: PHYSICIAN ASSISTANT

## 2023-01-01 PROCEDURE — 87040 BLOOD CULTURE FOR BACTERIA: CPT | Performed by: EMERGENCY MEDICINE

## 2023-01-01 PROCEDURE — 99285 EMERGENCY DEPT VISIT HI MDM: CPT | Mod: CS,25

## 2023-01-01 PROCEDURE — 84484 ASSAY OF TROPONIN QUANT: CPT | Performed by: EMERGENCY MEDICINE

## 2023-01-01 PROCEDURE — 36415 COLL VENOUS BLD VENIPUNCTURE: CPT | Performed by: EMERGENCY MEDICINE

## 2023-01-01 PROCEDURE — 83605 ASSAY OF LACTIC ACID: CPT | Mod: 91

## 2023-01-01 PROCEDURE — 96360 HYDRATION IV INFUSION INIT: CPT

## 2023-01-01 PROCEDURE — 99284 EMERGENCY DEPT VISIT MOD MDM: CPT | Mod: 25

## 2023-01-01 PROCEDURE — 258N000003 HC RX IP 258 OP 636: Performed by: EMERGENCY MEDICINE

## 2023-01-01 PROCEDURE — 82803 BLOOD GASES ANY COMBINATION: CPT

## 2023-01-01 PROCEDURE — 99285 EMERGENCY DEPT VISIT HI MDM: CPT | Mod: 25,CS | Performed by: EMERGENCY MEDICINE

## 2023-01-01 PROCEDURE — 250N000013 HC RX MED GY IP 250 OP 250 PS 637: Performed by: EMERGENCY MEDICINE

## 2023-01-01 PROCEDURE — 72128 CT CHEST SPINE W/O DYE: CPT | Mod: 26 | Performed by: RADIOLOGY

## 2023-01-01 PROCEDURE — 96361 HYDRATE IV INFUSION ADD-ON: CPT

## 2023-01-01 PROCEDURE — 71046 X-RAY EXAM CHEST 2 VIEWS: CPT

## 2023-01-01 PROCEDURE — 83605 ASSAY OF LACTIC ACID: CPT | Performed by: EMERGENCY MEDICINE

## 2023-01-01 PROCEDURE — 83605 ASSAY OF LACTIC ACID: CPT | Performed by: PHYSICIAN ASSISTANT

## 2023-01-01 PROCEDURE — 72131 CT LUMBAR SPINE W/O DYE: CPT

## 2023-01-01 PROCEDURE — 72131 CT LUMBAR SPINE W/O DYE: CPT | Mod: 26 | Performed by: RADIOLOGY

## 2023-01-01 RX ORDER — CHOLECALCIFEROL (VITAMIN D3) 50 MCG
1 TABLET ORAL DAILY
COMMUNITY

## 2023-01-01 RX ORDER — LIDOCAINE HCL 4% 4 G/100G
CREAM TOPICAL 3 TIMES DAILY
COMMUNITY

## 2023-01-01 RX ORDER — CEPHALEXIN 500 MG/1
500 CAPSULE ORAL 3 TIMES DAILY
Qty: 30 CAPSULE | Refills: 0 | Status: SHIPPED | OUTPATIENT
Start: 2023-01-01 | End: 2023-01-01

## 2023-01-01 RX ORDER — CIPROFLOXACIN 250 MG/1
250 TABLET, FILM COATED ORAL 2 TIMES DAILY
COMMUNITY
Start: 2023-01-01 | End: 2023-01-01

## 2023-01-01 RX ORDER — SODIUM CHLORIDE 9 MG/ML
INJECTION, SOLUTION INTRAVENOUS CONTINUOUS
Status: DISCONTINUED | OUTPATIENT
Start: 2023-01-01 | End: 2023-01-01 | Stop reason: HOSPADM

## 2023-01-01 RX ORDER — ACETAMINOPHEN 325 MG/1
650 TABLET ORAL EVERY 4 HOURS PRN
COMMUNITY

## 2023-01-01 RX ORDER — QUETIAPINE FUMARATE 50 MG/1
25 TABLET, FILM COATED ORAL EVERY 12 HOURS PRN
COMMUNITY

## 2023-01-01 RX ORDER — CEPHALEXIN 500 MG/1
500 CAPSULE ORAL ONCE
Status: COMPLETED | OUTPATIENT
Start: 2023-01-01 | End: 2023-01-01

## 2023-01-01 RX ORDER — CEPHALEXIN 500 MG/1
500 CAPSULE ORAL 2 TIMES DAILY
Qty: 14 CAPSULE | Refills: 0 | Status: SHIPPED | OUTPATIENT
Start: 2023-01-01 | End: 2023-01-01

## 2023-01-01 RX ORDER — IOPAMIDOL 755 MG/ML
71 INJECTION, SOLUTION INTRAVASCULAR ONCE
Status: COMPLETED | OUTPATIENT
Start: 2023-01-01 | End: 2023-01-01

## 2023-01-01 RX ORDER — LEVETIRACETAM 250 MG/1
250 TABLET ORAL 2 TIMES DAILY
COMMUNITY

## 2023-01-01 RX ADMIN — SODIUM CHLORIDE 500 ML: 9 INJECTION, SOLUTION INTRAVENOUS at 13:11

## 2023-01-01 RX ADMIN — CEPHALEXIN 500 MG: 500 CAPSULE ORAL at 20:09

## 2023-01-01 RX ADMIN — SODIUM CHLORIDE 500 ML: 9 INJECTION, SOLUTION INTRAVENOUS at 19:33

## 2023-01-01 RX ADMIN — SODIUM CHLORIDE 1000 ML: 9 INJECTION, SOLUTION INTRAVENOUS at 16:41

## 2023-01-01 RX ADMIN — IOPAMIDOL 71 ML: 755 INJECTION, SOLUTION INTRAVENOUS at 12:38

## 2023-01-01 ASSESSMENT — ENCOUNTER SYMPTOMS
CONFUSION: 1
FATIGUE: 1
WEAKNESS: 1
WEAKNESS: 1
APPETITE CHANGE: 1
HEMATURIA: 1

## 2023-01-01 ASSESSMENT — ACTIVITIES OF DAILY LIVING (ADL)
ADLS_ACUITY_SCORE: 35

## 2023-01-24 NOTE — ED NOTES
Bed: ED22  Expected date: 1/24/23  Expected time: 4:10 PM  Means of arrival: Ambulance  Comments:  Aliza 305

## 2023-01-24 NOTE — ED PROVIDER NOTES
History     Chief Complaint:  Generalized Weakness       The history is provided by the spouse. History limited by: end stage dementia (nonverbal)      Loly Oliveira is a 77 year old female with a history of end stage dementia (nonverbal) who presents via EMS with generalized weakness. The  states that the patient has not been acting like herself for the past two days. He states that the staff at her facility reported she has a decreased appetite, increased weakness, shakiness, decreased urine output, and darker urine. He states that she used to get up and walk around, but for the past two days all she has wanted to do is sleep. He also notes that she has been looking down and to the left, and will not straighten her neck.     Independent Historian:    Spouse/Partner    Review of External Notes: I reviewed the note from 11/3/22.     ROS:  Review of Systems   Reason unable to perform ROS: end stage dementia (nonverbal)   Constitutional: Positive for appetite change and fatigue.   Genitourinary: Positive for decreased urine volume.   Neurological: Positive for weakness (generalized).       Allergies:  Aricept [Donepezil]     Medications:    Lexapro  Namenda  Seroquel  Exelon    Past Medical History:    Past Medical History:   Diagnosis Date     Bell's palsy      Chondromalacia of patella      Dementia without behavioral disturbance, unspecified dementia type 11/19/2015     Diverticulitis of colon (without mention of hemorrhage)(562.11)      Esophageal reflux      OSTEOPENIA      Raynaud disease      RESTLESS LEGS SYNDROME      Rosacea      TINNITUS       Trochanteric bursitis 9/09       Past Surgical History:    Past Surgical History:   Procedure Laterality Date     HC DILATION/CURETTAGE DIAG/THER NON OB  1970     HC REPAIR ROTATOR CUFF,CHRONIC       HERNIA REPAIR, UMBILICAL  1989     SALPINGO OOPHORECTOMY,R/L/CATHIE  1986     TONSILLECTOMY & ADENOIDECTOMY  1947     ZZC ANTER VESICOURETHROPEXY,SIMPLE  1986      ZZC ANTER VESICOURETHROPEXY,SIMPLE  1989    bergeron     Z NONSPECIFIC PROCEDURE  1999    right arthroscopic surgery     Z NONSPECIFIC PROCEDURE  1974    right shoulder decompression     Z NONSPECIFIC PROCEDURE  Feb 2010    Closed manipulation, left shoulder     Z TOTAL ABDOM HYSTERECTOMY  1986        Family History:    Father: alcohol/drug, cancer  Mother: cardiovascular, cerebrovascular disease, GI disease   Brother: CAD  Son: eye disorder    Social History:  Presents to the ED unaccompanied  PCP: Danita Choe     Physical Exam     Patient Vitals for the past 24 hrs:   BP Temp Temp src Pulse Resp SpO2   01/24/23 1622 (!) 147/90 97.7  F (36.5  C) Oral 77 16 94 %        Physical Exam  Constitutional: Eyes open. Not following commands. Nonverbal. On passive observation moving all extremities.   Head: Atraumatic.  Mouth/Throat: Oropharynx is clear and moist. No oropharyngeal exudate.  Eyes: Conjunctivae and EOM are normal. No scleral icterus.  Neck: Moving neck freely. No meningismus. Neck supple.   Cardiovascular: Normal rate, regular rhythm, normal heart sounds and intact distal perfusion.   Pulmonary/Chest: Breath sounds normal. No respiratory distress.  Abdominal: Soft. Bowel sounds are normal. No distension. No tenderness. No rebound or guarding.   Musculoskeletal: Normal range of motion. No edema or tenderness.   Neurological: Eyes open. Does not follow commands. No observable focal neuro deficit  Skin: Warm and dry. No rash noted. Not diaphoretic.     Emergency Department Course   ECG  ECG taken at 1649, ECG read at 1649  Sinus rhythm with short ID. Low voltage QRS. Borderline ECG.    Rate 78 bpm. ID interval 106 ms. QRS duration 72 ms. QT/QTc 372/424 ms. P-R-T axes 17 5 48.     Imaging:  CT Head w/o Contrast   Final Result   IMPRESSION:   1.  No CT evidence for acute intracranial process.   2.  Brain atrophy and presumed chronic microvascular ischemic changes as above.      XR Chest 2 Views     (Results Pending)      Report per radiology    Laboratory:  Labs Ordered and Resulted from Time of ED Arrival to Time of ED Departure   COMPREHENSIVE METABOLIC PANEL - Abnormal       Result Value    Sodium 142      Potassium 4.4      Chloride 105      Carbon Dioxide (CO2) 30 (*)     Anion Gap 7      Urea Nitrogen 14.0      Creatinine 0.70      Calcium 9.1      Glucose 125 (*)     Alkaline Phosphatase 85      AST 26      ALT 11      Protein Total 6.5      Albumin 4.1      Bilirubin Total 0.3      GFR Estimate 87     LACTIC ACID WHOLE BLOOD - Abnormal    Lactic Acid 2.5 (*)    TROPONIN T, HIGH SENSITIVITY - Normal    Troponin T, High Sensitivity 8     CBC WITH PLATELETS AND DIFFERENTIAL    WBC Count 9.6      RBC Count 4.20      Hemoglobin 13.7      Hematocrit 41.7      MCV 99      MCH 32.6      MCHC 32.9      RDW 12.6      Platelet Count 181      % Neutrophils 85      % Lymphocytes 9      % Monocytes 6      % Eosinophils 0      % Basophils 0      % Immature Granulocytes 0      NRBCs per 100 WBC 0      Absolute Neutrophils 8.1      Absolute Lymphocytes 0.9      Absolute Monocytes 0.6      Absolute Eosinophils 0.0      Absolute Basophils 0.0      Absolute Immature Granulocytes 0.0      Absolute NRBCs 0.0     ROUTINE UA WITH MICROSCOPIC REFLEX TO CULTURE   INFLUENZA A/B & SARS-COV2 PCR MULTIPLEX        Procedures   None    Emergency Department Course & Assessments:             Interventions:  Medications   0.9% sodium chloride BOLUS (1,000 mLs Intravenous New Bag 1/24/23 1641)     Followed by   sodium chloride 0.9% infusion (has no administration in time range)        Independent Interpretation (X-rays, CTs, rhythm strip):       Consultations/Discussion of Management or Tests:       Social Determinants of Health affecting care:  Healthcare Access/Compliance in full cares end stage dementia nonverbal.     Assessments:  1635 I obtained history and examined the patient as noted above.   1727 Patient rechecked and updated.      Disposition:  Care of the patient was transferred to my colleague Dr. Kitchen pending CT head, chest Xray and repeat lactic acid.         Medical Decision Making:  Loly Oliveira is a 77 year old female who presents for evaluation of generalized weakness.  Associated symptoms today have included decreased appetite, sleepiness, decreased activity, and decreased urine output.  The workup here in the emergency room was to evaluate for infections, metabolic, electrolyte, neurologic or cardiovascular causes.  In addition, I do not believe symptoms are due to new neuro, myopathy or acute coronary syndromes. The initial lactic acid is elevated and IV fluids have been started. Patient will be signed out to Dr. Kitchen pending CT head, chest Xray and repeat lactic acid. I anticipate discharge home as the  informed me that the patient's facility is able to provide complete care around the clock and has end stage dementia     Diagnosis:    ICD-10-CM    1. Muscle weakness (generalized)  M62.81       2. Altered mental status, unspecified altered mental status type  R41.82       3. Severe Alzheimer's dementia, unspecified timing of dementia onset, unspecified whether behavioral, psychotic, or mood disturbance or anxiety (H)  G30.9     F02.C0       4. Elevated serum lactate dehydrogenase  R74.02            Discharge Medications:  New Prescriptions    No medications on file        Scribe Disclosure:  I, Belem Cadena, am serving as a scribe at 4:31 PM on 1/24/2023 to document services personally performed by Isael Acuna MD based on my observations and the provider's statements to me.     1/24/2023   Isael Acuna MD Stevens, Andrew C, MD  01/24/23 1756

## 2023-01-24 NOTE — ED TRIAGE NOTES
Patient arrives via EMS from [facility] in [facility] with complaints of increased weakness, shakiness, decreased urine output, decreased appetite, sleepy, leaning more, general weakness. Pat has history of Alzheimer's, patient can speak but does not much per  who is bedside. 's name is Jose Carlos.     Triage Assessment     Row Name 01/24/23 9938       Triage Assessment (Adult)    Airway WDL WDL       Respiratory WDL    Respiratory WDL WDL       Cardiac WDL    Cardiac WDL WDL       Cognitive/Neuro/Behavioral WDL    Cognitive/Neuro/Behavioral WDL X    Level of Consciousness confused  baseline Azheimers    Orientation disoriented to;person;place;time;situation  non verbal

## 2023-01-26 NOTE — ED NOTES
Report called to [facility] in [place], [place] at [phone].  Spoke with RN, Tanya HUTCHINS

## 2023-01-26 NOTE — ED NOTES
Bed: ABDON  Expected date: 1/26/23  Expected time: 12:08 PM  Means of arrival:   Comments:  Aliza-ZAKIA

## 2023-01-26 NOTE — DISCHARGE INSTRUCTIONS
Drink plenty fluids to stay hydrated.    You should minimize use of Seroquel while you have a urinary tract infection as this can lead to oversedation.    Please follow-up with your physician on Monday, January 30.    Return to the ER for any further problems.   PAST SURGICAL HISTORY:  History of circumcision     History of tonsillectomy

## 2023-01-26 NOTE — RESULT ENCOUNTER NOTE
Final Urine Culture Report on 1/26/23  [facility] Emergency Dept discharge antibiotic prescribed: Cephalexin (Keflex) 500 mg capsule, 1 capsule (500 mg) by mouth 3 times daily for 10 days.  #1. Bacteria, 50,000 - 100,000 CFU/ML Escherichia coli , is SUSCEPTIBLE to Antibiotic.    No change in treatment per [facility] ED lab result Urine Culture protocol.

## 2023-01-26 NOTE — ED PROVIDER NOTES
History     Chief Complaint:  Generalized Weakness       The history is limited by the condition of the patient.      Loly Oliveira is a 77 year old female who presents with dementia and a UTI.      Independent Historian: Per EMS and nurse the patient was seen here 2 days ago for a UTI. She was sent back to the care facility of [facility] but the staff state that she has gotten even worse and will no longer get out of bed and is weak. She does have a history of dementia and is unable to say if anything hurts.    Independent historian: Nursing home staff and patient's     Review of External Notes: Reviewed ED note from 01/24/23     ROS:  Review of Systems   Unable to perform ROS: Dementia   Neurological: Positive for weakness.     Allergies:  Aricept [Donepezil]     Medications:    cephALExin  escitalopram  memantine   QUEtiapine   rivastigmine     Past Medical History:  Hull palsy  Alzheimer Dementia  DVT of colon  Esophageal reflux  Osteopenia  Raynaud disease  Restless leg syndrome        Past Surgical History:    D and C  Rotator cuff repair  Hernia repair  Right shoulder decompression  Hysterectomy  oophorectomy       Family History:    family history includes Alcohol/Drug in her father and son; Breast Cancer in an other family member; C.A.D. in her brother; Cancer in her father; Cardiovascular in her mother; Cerebrovascular Disease in her mother; Depression in her father; Diabetes in her paternal grandmother; Eye Disorder in her son; Gastrointestinal Disease in her mother; Musculoskeletal Disorder in an other family member; Neurologic Disorder in some other family members; Osteoporosis in her mother and paternal grandmother.    Social History:   reports that she has never smoked. She has never used smokeless tobacco. She reports current alcohol use. She reports that she does not use drugs.  PCP: Danita Choe     Physical Exam     Patient Vitals for the past 24 hrs:   BP Temp Temp src Pulse Resp  SpO2   01/26/23 1502 118/53 -- -- 57 -- 97 %   01/26/23 1407 117/62 -- -- 58 -- 98 %   01/26/23 1308 -- -- -- 57 -- 99 %   01/26/23 1253 119/64 -- -- -- -- --   01/26/23 1216 133/66 96.8  F (36  C) Temporal 60 15 96 %        Physical Exam  Constitutional:       General: She is not in acute distress.     Appearance: She is not diaphoretic.   HENT:      Head: Atraumatic.      Mouth/Throat:      Mouth: Mucous membranes are moist.      Pharynx: Oropharynx is clear. No oropharyngeal exudate.   Eyes:      General: No scleral icterus.     Pupils: Pupils are equal, round, and reactive to light.   Cardiovascular:      Rate and Rhythm: Normal rate and regular rhythm.      Heart sounds: Normal heart sounds.   Pulmonary:      Effort: No respiratory distress.      Breath sounds: Normal breath sounds.   Abdominal:      General: Bowel sounds are normal.      Palpations: Abdomen is soft.      Tenderness: There is no abdominal tenderness.   Musculoskeletal:         General: No tenderness.      Cervical back: Neck supple. No rigidity.   Skin:     General: Skin is warm.      Capillary Refill: Capillary refill takes less than 2 seconds.      Findings: No rash.   Neurological:      Comments: Minimally verbally responsive.  Follows commands.  Grossly moving all extremities.   Psychiatric:         Mood and Affect: Mood normal.         Behavior: Behavior normal.         Emergency Department Course   ECG:  This ECG was taken at 1254, and signed at 1301  Sinus bradycardia  Low voltage QRS  Borderline ECG  Vent. rate 57, LA interval 122, QRS Duration 70, QT/QTc 424/412, P-R-T axes 42 -6 22      Imaging:  CT Head w/o Contrast   Final Result   IMPRESSION: No acute intracranial abnormality.      MAGALY CARRILLO MD            SYSTEM ID:  [id number]         Report per radiology    Laboratory:  Labs Ordered and Resulted from Time of ED Arrival to Time of ED Departure   COMPREHENSIVE METABOLIC PANEL - Abnormal       Result Value    Sodium 141       Potassium 4.4      Chloride 107      Carbon Dioxide (CO2) 24      Anion Gap 10      Urea Nitrogen 11.5      Creatinine 0.62      Calcium 8.6 (*)     Glucose 97      Alkaline Phosphatase 68      AST 36 (*)     ALT 11      Protein Total 5.9 (*)     Albumin 3.4 (*)     Bilirubin Total 0.2      GFR Estimate 90     CBC WITH PLATELETS AND DIFFERENTIAL - Abnormal    WBC Count 6.5      RBC Count 3.76 (*)     Hemoglobin 12.2      Hematocrit 37.4            MCH 32.4      MCHC 32.6      RDW 12.3      Platelet Count 151      % Neutrophils 72      % Lymphocytes 16      % Monocytes 11      % Eosinophils 1      % Basophils 0      % Immature Granulocytes 0      NRBCs per 100 WBC 0      Absolute Neutrophils 4.7      Absolute Lymphocytes 1.1      Absolute Monocytes 0.7      Absolute Eosinophils 0.0      Absolute Basophils 0.0      Absolute Immature Granulocytes 0.0      Absolute NRBCs 0.0     LACTIC ACID WHOLE BLOOD - Normal    Lactic Acid 0.7     TROPONIN T, HIGH SENSITIVITY - Normal    Troponin T, High Sensitivity 8     ROUTINE UA WITH MICROSCOPIC REFLEX TO CULTURE   URINE CULTURE   BLOOD CULTURE   BLOOD CULTURE      Emergency Department Course & Assessments:    Interventions:  Medications   sodium chloride 0.9% infusion (has no administration in time range)        Independent Interpretation (X-rays, CTs, rhythm strip):  Reviewed the patients CT and EKG.    Consultations/Discussion of Management or Tests:  ED Course as of 01/26/23 1538   Thu Jan 26, 2023   1405 Rechecked the patient       Social Determinants of Health affecting care:  Patient lives in a nursing home.     Disposition:  The patient was discharged to home.     Impression & Plan        Medical Decision Making:  This patient is a 77-year-old woman with history dementia.  She was here 2 days ago with urinary tract infection and has been taking Keflex.  Staff from the nursing home referred her in the ED today as she seemed more drowsy than usual.  I suspect most  likely this is related to having Seroquel last night in the setting of her infection.  Her mental status has not returned to baseline and her  feels that she is back to usual.  She was able to drink fluids and eat here in the ED.  Lab work looks good.  CT negative.  She is appropriate for discharge back to her facility.  We did check blood cultures but her x-rays not consistent with sepsis or bacteremia.    Diagnosis:    ICD-10-CM    1. Acute cystitis without hematuria  N30.00               Scribe Disclosure:  I, Uziel Valdez, am serving as a scribe at 12:26 PM on 1/26/2023 to document services personally performed by Sterling Keys MD based on my observations and the provider's statements to me.     1/26/2023   Sterling Keys MD McRoberts, Sean Edward, MD  01/26/23 1532

## 2023-01-26 NOTE — ED TRIAGE NOTES
"Patient arrives via EMS.  Per EMS she was seen here a few days ago with a UTI.  She has \"not gotten better\" and has generalized weakness.  She will walk at baseline and has not in the last day.  History of dementia.  ABCS intact.     Triage Assessment     Row Name 01/26/23 1217       Triage Assessment (Adult)    Airway WDL WDL       Respiratory WDL    Respiratory WDL WDL       Skin Circulation/Temperature WDL    Skin Circulation/Temperature WDL WDL       Cardiac WDL    Cardiac WDL WDL       Peripheral/Neurovascular WDL    Peripheral Neurovascular WDL WDL       Cognitive/Neuro/Behavioral WDL    Cognitive/Neuro/Behavioral WDL X    Level of Consciousness confused  baseline dementia              "

## 2023-01-31 NOTE — ED PROVIDER NOTES
History     Chief Complaint:  Fall       The history is provided by the EMS personnel. The history is limited by the condition of the patient.      Loly Oliveira is a 77 year old female with a history of dementia who presents after fall. Per triage nurse, the patient arrived to the ED via EMS from [facility] care unit. The patient fell out of her wheelchair, unwitnessed but heard. Patient hit head but does not think there was loss of consciousness. Patient's  wanted the patent transferred to [facility]. Patient is at baseline per EMS.     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   Dementia   Diverticulitis   Esophageal reflux  Osteopenia  Raynaud disease  Restless leg syndrome  Rosacea  Tinnitus   Trochanteric bursitis        Past Surgical History:    Anterior Vesicourethropexy  Right arthroscopic surgery   Right shoulder decompression  Closed manipulation, left shoulder   Abdominal hysterectomy   D&C  Repair rotator cuff  Hernia repair, umbilical  Salpingo oophorectomy   Tonsillectomy & Adenoidectomy       Family History:     Mother- cardiovascular, cerebrovascular disease, GI disease   Father- alcohol/ drug, cancer, depression     Social History:  Reports that she has never smoked. She has never used smokeless tobacco. She reports current alcohol use. She reports that she does not use drugs.  PCP: Danita Choe     Physical Exam     Patient Vitals for the past 24 hrs:   BP Temp Temp src Pulse Resp SpO2   01/30/23 1913 -- 98.8  F (37.1  C) Temporal -- -- --   01/30/23 1910 -- -- -- -- 17 --   01/30/23 1907 101/72 -- -- 69 -- 98 %        Physical Exam  Constitutional: Well  developed, nontox appearance  Head: Atraumatic.   Mouth/Throat: Oropharynx is clear and moist.   Neck:  no stridor, no obvious C spine tenderness  Eyes: no scleral icterus  Cardiovascular: RRR, 2+ bilat radial pulses  Pulmonary/Chest: nml resp effort, Clear BS bilat, chest NT  Abdominal: ND, soft, NT, no rebound or guarding   Back: no T or L spine tenderness  : no CVA tenderness bilat  Ext: Warm, well perfused, no edema  Neurological: Alert, nonverbal, symmetric facies, moves ext x4  Skin: Skin is warm and dry.   Psychiatric: Behavior is normal. Thought content normal.   Nursing note and vitals reviewed.    Emergency Department Course   Imaging:  CT Cervical Spine w/o Contrast   Final Result   IMPRESSION:   1.  Mild cervical spondylosis. Negative for cervical fracture or traumatic malalignment.      2.  Age-indeterminate, mild upper T4 vertebral body compression fracture with minimal 1 mm bone retropulsion.      3.  Findings discussed with Dr. Haile in the emergency department on 2023 8:24 PM      CT Head w/o Contrast   Final Result   IMPRESSION:   1.  No CT evidence for acute intracranial process.   2.  Brain atrophy and presumed chronic microvascular ischemic changes as above.         Report per radiology  Emergency Department Course & Assessments:    Independent Interpretation (X-rays, CTs, rhythm strip):  See [place]     Social Determinants of Health affecting care:  See [place]    Assessments:  ED Course as of 23 I obtained history and examined the patient as noted above     I rechecked the patient      Disposition:  The patient was discharged to home.     Impression & Plan    Medical Decision Makin year old female presenting w/ reported head strike s/p mechanical fall     Social determinants affecting patient's health include:  Alzheimer's disease necessitating memory care facility, unwitnessed fall and increased risk for emergency department visits given age and  dementia     I reviewed medical records from  7/19/2022 nursing home visit for our review of the patient's health history and current status     DDx includes mechanical fall, fracture, contusion, intracranial hemorrhage, skull fracture.  Doubt seizure, syncope, CVA given history and physical exam.  No other evidence of trauma on full primary and secondary trauma surveys other than areas imaged above or documented in physical exam.  Given mechanical fall and no other complaints, EKG and blood work deferred. Imaging sig for no acute traumatic abnormality, patient has no T-spine tenderness on exam given she appears comfortable, no further intervention is likely indicated for her fracture.  Interventions as noted above.  At this time I feel the pt is safe for discharge.  Written recommendations given regarding follow up with PCP and return to the emergency department as needed for new or worsening symptoms in addition to written results and diagnosis.. Patient discharged in stable condition.         Diagnosis:    ICD-10-CM    1. Fall, initial encounter  W19.XXXA       2. Injury of head, initial encounter  S09.90XA             Scribe Disclosure:  I, Mag Miller, am serving as a scribe at 7:21 PM on 1/30/2023 to document services personally performed by Matt Haile MD based on my observations and the provider's statements to me.     1/30/2023   Matt Haile MD Vaughn, Christopher E, MD  01/30/23 4333

## 2023-01-31 NOTE — ED TRIAGE NOTES
Pt arrives via EMS from [facility] from memory care unit. Pt fell out of wheel chair.  wanted pt transferred to [facility]. Pt not on thinners. Fall was unwitnessed, but heard. Pt do not think LOC happened, pt hit head. Pt is at baseline per EMS. Per EMS no wincing w/ palpation of neck/back. Per EMS mostly unverbal at baseline.

## 2023-01-31 NOTE — DISCHARGE INSTRUCTIONS
Discharge Instructions  Head Injury    You have been seen today for a head injury. Your evaluation included a history and physical examination. You may have had a CT (CAT) scan performed, though most head injuries do not require a scan. Based on this evaluation, your provider today does not feel that your head injury is serious.    Generally, every Emergency Department visit should have a follow-up clinic visit with either a primary or a specialty clinic/provider. Please follow-up as instructed by your emergency provider today.  Return to the Emergency Department if:  You are confused or you are not acting right.  Your headache gets worse or you start to have a really bad headache even with your recommended treatment plan.  You vomit (throw up) more than once.  You have a seizure.  You have trouble walking.  You have weakness or paralysis (cannot move) in an arm or a leg.  You have blood or fluid coming from your ears or nose.  You have new symptoms or anything that worries you.    Sleeping:  It is okay for you to sleep, but someone should wake you up if instructed by your provider, and someone should check on you at your usual time to wake up.     Activity:  Do not drive for at least 24 hours.  Do not drive if you have dizzy spells or trouble concentrating, or remembering things.  Do not return to any contact sports until cleared by your regular provider.     MORE INFORMATION:    Concussion:  A concussion is a minor head injury that may cause temporary problems with the way the brain works. Although concussions are important, they are generally not an emergency or a reason that a person needs to be hospitalized. Some concussion symptoms include confusion, amnesia (forgetful), nausea (sick to your stomach) and vomiting (throwing up), dizziness, fatigue, memory or concentration problems, irritability and sleep problems. For most people, concussions are mild and temporary but some will have more severe and  persistent symptoms that require on-going care and treatment.  CT Scans: Your evaluation today may have included a CT scan (CAT scan) to look for things like bleeding or a skull fracture (broken bone).  CT scans involve radiation and too many CT scans can cause serious health problems like cancer, especially in children.  Because of this, your provider may not have ordered a CT scan today if they think you are at low risk for a serious or life threatening problem.    If you were given a prescription for medicine here today, be sure to read all of the information (including the package insert) that comes with your prescription.  This will include important information about the medicine, its side effects, and any warnings that you need to know about.  The pharmacist who fills the prescription can provide more information and answer questions you may have about the medicine.  If you have questions or concerns that the pharmacist cannot address, please call or return to the Emergency Department.     Remember that you can always come back to the Emergency Department if you are not able to see your regular provider in the amount of time listed above, if you get any new symptoms, or if there is anything that worries you.

## 2023-02-17 NOTE — ED NOTES
Bed: ED36  Expected date:   Expected time:   Means of arrival: Ambulance  Comments:  [facility] 93 m

## 2023-02-17 NOTE — ED PROVIDER NOTES
History     Chief Complaint:  Altered Mental Status       The history is provided by the EMS personnel.      Loly Oliveira is a 77 year old female with a history of Alzheimer's dementia who presents with altered mental status. Patient presents via EMS from [facility] care facility. Patient has been producing dark urine for the last week with decreased urine output. Patient doesn't have a chronic catheter. They tried to straight catheter as she has been peeing less.  The patient was started on Cipro yesterday due to the dark urine. Staff also noticed decresed verbal responses and unable to ambulate today, and overall altered for the last 2-3 days.     Independent Historian:   EMS     Review of External Notes: Visit from 1/26/2023    ROS:  Review of Systems   Genitourinary: Positive for hematuria.   Psychiatric/Behavioral: Positive for confusion.   All other systems reviewed and are negative.      Allergies:  Aricept    Medications:    Lexapro   Namenda    Seroquel   Exelon   Keppra     Past Medical History:    Montgomery palsy   Chondromalacia of patella   Alzheimer's dementia   Diverticulitis of colon   Esophageal reflux   Osteopenia   Raynaud disease   Restless legs syndrome   Rosacea   Tinnitus trochanteric bursitis   Diethylstilbestrol exposure as fetus   Extrapyramidal disease     Past Surgical History:    D&C   Rotator cuff repair   Hernia repair   Salpingo oophorectomy   Tonsillectomy   Adenoidectomy   Anter vesicourethropexy   Arthroscopic surgery   Hysterectomy      Family History:    Mother- cardiovascular, cerebrovascular disease, gastrointestinal disease, osteoporosis   Father- alcohol/ drug, depression     Social History:  Reports that she has never smoked. She has never used smokeless tobacco. She reports current alcohol use. She reports that she does not use drugs.  Presents via EMS   Presents alone   PCP: Danita Choe     Physical Exam     Patient Vitals for the past 24 hrs:   BP Temp Pulse Resp  SpO2   02/17/23 1330 113/53 -- 72 -- 99 %   02/17/23 1143 -- 97.4  F (36.3  C) -- -- --   02/17/23 1128 115/86 -- 74 18 98 %        Physical Exam  General: Well appearing, pleasant.   HEENT: Conjunctive are clear.  Extraocular eye movements intact.  Neck range of motion intact.  Nose and throat patent.  No evidence of trauma.  Respiratory: Good breath sounds bilaterally  Cardiovascular: Normal rate and rhythm   Gastrointestinal: Soft, mild diffuse tenderness  Musculoskeletal: Atraumatic.  There is no cervical or spinal tenderness.  Has no wounds to her back..  Skin: Exposed skin clear.  External rectal exam is unremarkable.  Neurologic: Alert.  Patient is oriented to self.  She responds to questions but inappropriately.  She will look at me.  She does not know where she is.  Moves all extremities.  Psych:  Patient is cooperative      Emergency Department Course   Imaging:  XR Chest 1 View   Preliminary Result   IMPRESSION: No acute cardiopulmonary disease. Stable left base   atelectasis.      CT Abdomen Pelvis w Contrast   Preliminary Result   IMPRESSION:    1.  Severe urinary bladder wall thickening with adjacent edema   suggestive of cystitis.   2.  Small pericardial effusion.   3.  Prominent stool at the rectum.   4.  Colonic diverticulosis without convincing diverticulitis.      Head CT w/o contrast   Final Result   IMPRESSION:      1. No evidence of acute intracranial hemorrhage, mass, or herniation.   2. Moderate diffuse parenchymal volume loss and mild white matter   changes likely due to chronic microvascular ischemic disease.         STANISLAW HOWE MD            SYSTEM ID:  [id number]         Report per radiology    Laboratory:  Labs Ordered and Resulted from Time of ED Arrival to Time of ED Departure   BASIC METABOLIC PANEL - Abnormal       Result Value    Sodium 139      Potassium 3.7      Chloride 102      Carbon Dioxide (CO2) 29      Anion Gap 8      Urea Nitrogen 10.5      Creatinine 0.60      Calcium 8.7  (*)     Glucose 106 (*)     GFR Estimate >90     CBC WITH PLATELETS AND DIFFERENTIAL - Abnormal    WBC Count 10.0      RBC Count 3.77 (*)     Hemoglobin 12.0      Hematocrit 37.1      MCV 98      MCH 31.8      MCHC 32.3      RDW 12.3      Platelet Count 227      % Neutrophils 77      % Lymphocytes 13      % Monocytes 9      % Eosinophils 1      % Basophils 0      % Immature Granulocytes 0      NRBCs per 100 WBC 0      Absolute Neutrophils 7.7      Absolute Lymphocytes 1.3      Absolute Monocytes 0.9      Absolute Eosinophils 0.1      Absolute Basophils 0.0      Absolute Immature Granulocytes 0.0      Absolute NRBCs 0.0     UA MACROSCOPIC WITH REFLEX TO MICRO AND CULTURE - Abnormal    Color Urine Yellow      Appearance Urine Slightly Cloudy (*)     Glucose Urine Negative      Bilirubin Urine Negative      Ketones Urine Trace (*)     Specific Gravity Urine >1.035 (*)     Blood Urine Large (*)     pH Urine 6.0      Protein Albumin Urine 200 (*)     Urobilinogen Urine 3.0 (*)     Nitrite Urine Negative      Leukocyte Esterase Urine Large (*)     Bacteria Urine Few (*)     Mucus Urine Present (*)     Calcium Oxalate Crystals Urine Few (*)     RBC Urine >182 (*)     WBC Urine >182 (*)     Squamous Epithelials Urine <1     ISTAT GASES LACTATE VENOUS POCT - Abnormal    Lactic Acid POCT 1.7      Bicarbonate Venous POCT 32 (*)     O2 Sat, Venous POCT 29 (*)     pCO2V Venous POCT 59 (*)     pH Venous POCT 7.34      pO2 Venous POCT 21 (*)    LACTIC ACID WHOLE BLOOD - Normal    Lactic Acid 1.8     URINE CULTURE        Emergency Department Course & Assessments:  Interventions:  Medications   iopamidol (ISOVUE-370) solution 71 mL (71 mLs Intravenous Given 2/17/23 1238)   sodium chloride (PF) 0.9% PF flush 58 mL (58 mLs Intravenous Given 2/17/23 1238)   0.9% sodium chloride BOLUS (0 mLs Intravenous Stopped 2/17/23 1421)      Independent Interpretation (X-rays, CTs, rhythm strip):  None    Social Determinants of Health affecting  care:   Thiernojason's Dementia    Assessments:  ED Course as of 02/17/23 1528   Fri Feb 17, 2023   1141 I obtained history and examined the patient as noted above    1332 I rechecked the patient and spoke with family in room. Patient is said to be at baseline, according to family     Disposition:  The patient was discharged to home.     Impression & Plan    Medical Decision Making:  Loly Oliveira is a 77 year old female with history of dementia, currently bedridden, presents to the ER for evaluation of possible altered mental status and decreased urine output.  See HPI.  Her vitals are unremarkable.  She has a limited exam due to her sensorium.  She is able to communicate some though.  Labs were obtained and unremarkable.  Her urine appears infected.  Urine culture sent.  Chest x-ray unremarkable.  CT abdomen pelvis reveals bladder wall thickening, small pericardial effusion, stool at the rectum, and diverticulosis.  Findings were communicated with daughter and .  CT of the patient's head is unremarkable.  Upon repeat exam, family is at bedside and feels that the patient is communicating at baseline.  She had a urine culture from a visit On 1/24 that grew pansensitive E. coli.  Will treat with Keflex.  Patient was treated a couple days with Cipro at her living facility.  We will have them discontinue this.  Primary care follow-up is indicated and she is to return with new or worsening symptoms.  She is able to stand and pivot at baseline which was demonstrated here before discharge.  Family is comfortable with the patient going home and returning with new or worsening symptoms.  They have no further questions.     Diagnosis:    ICD-10-CM    1. Urinary tract infection without hematuria, site unspecified  N39.0       2. Dementia, unspecified dementia severity, unspecified dementia type, unspecified whether behavioral, psychotic, or mood disturbance or anxiety (H)  F03.90       3. Pericardial effusion  I31.39        4. Constipation, unspecified constipation type  K59.00            Discharge Medications:  New Prescriptions    CEPHALEXIN (KEFLEX) 500 MG CAPSULE    Take 1 capsule (500 mg) by mouth 2 times daily for 7 days          Scribe Disclosure:  I, Mag Miller, am serving as a scribe at 11:54 AM on 2/17/2023 to document services personally performed by Memo Arango PA-C based on my observations and the provider's statements to me.     2/17/2023   Memo Arango PA-C Cyr, Matthew R, PA-C  02/17/23 1528

## 2023-02-17 NOTE — ED TRIAGE NOTES
Pt presents for concern of increased confusion. Pt arrives via EMS from her memory care facility. Facility reports the patient has had increasing confusion for the past few days and today was able to ambulate and has had significant decrease in verbal. They report decreased urine output from the patient's rod which was removed at the facility and straight cath was attempted with no output. Facility reports increasingly dark urine for the past week and started the patient on a cipro yesterday, two dose received. Pt arrives confused, slow to respond to questions. Disoriented x4. ABC intact, Alert.     Triage Assessment     Row Name 02/17/23 1129       Triage Assessment (Adult)    Airway WDL WDL       Respiratory WDL    Respiratory WDL WDL       Skin Circulation/Temperature WDL    Skin Circulation/Temperature WDL WDL       Cardiac WDL    Cardiac WDL WDL       Peripheral/Neurovascular WDL    Peripheral Neurovascular WDL WDL       Cognitive/Neuro/Behavioral WDL    Cognitive/Neuro/Behavioral WDL WDL

## 2023-02-17 NOTE — PHARMACY-ADMISSION MEDICATION HISTORY
Admission medication history interview status for this patient is complete. See [facility] admission navigator for allergy information, prior to admission medications and immunization status.     Medication history interview done, indicate source(s): MAR from [facility]  Medication history resources (including written lists, pill bottles, clinic record):see above    Changes made to PTA medication list:  Added: vit d, cipro, levetiracetam, aspercreme, acetaminophen, seroquel prn  Changed: none  Reported as Not Taking: none  Removed: memantine    Actions taken by pharmacist (provider contacted, etc):None     Additional medication history information:None    Medication reconciliation/reorder completed by provider prior to medication history?  N   (Y/N)     Medication Affordability:  Not including over the counter (OTC) medications, was there a time in the past 12 months when you did not take your medications as prescribed because of cost?: No      Prior to Admission medications    Medication Sig Last Dose Taking? Auth Provider Long Term End Date   acetaminophen (TYLENOL) 325 MG tablet Take 650 mg by mouth every 4 hours as needed for mild pain  Yes Unknown, Entered By History     ciprofloxacin (CIPRO) 250 MG tablet Take 250 mg by mouth 2 times daily 2/17/2023 at am Yes Unknown, Entered By History  2/19/23   escitalopram (LEXAPRO) 10 MG tablet Take 1 tablet (10 mg) by mouth daily 2/17/2023 Yes Pradip Freeman MD Yes    levETIRAcetam (KEPPRA) 250 MG tablet Take 250 mg by mouth 2 times daily 2/17/2023 at am Yes Unknown, Entered By History Yes    lidocaine (ASPERCREME LIDOCAINE) 4 % external cream Apply topically 3 times daily Apply to upper back & neck topically three times a day for wedge compression, do not exceed 3 applications in a 24hr period 2/17/2023 at am Yes Unknown, Entered By History Yes    QUEtiapine (SEROQUEL) 25 MG tablet Take 1 tablet (25 mg) by mouth At Bedtime 2/16/2023 Yes Pradip Freeman MD Yes     QUEtiapine (SEROQUEL) 50 MG tablet Take 25 mg by mouth every 12 hours as needed (restlessness and agitation)  Yes Unknown, Entered By History Yes    rivastigmine (EXELON) 3 MG capsule Take 1 capsule (3 mg) by mouth 2 times daily 2/17/2023 at am Yes Pradip Freeman MD     vitamin D3 (CHOLECALCIFEROL) 50 mcg (2000 units) tablet Take 1 tablet by mouth daily 2/17/2023 Yes Unknown, Entered By History

## 2023-03-06 NOTE — ED PROVIDER NOTES
"ED Provider Note  [facility]      History     Chief Complaint   Patient presents with     Neck Problem     The history is provided by medical records, the spouse and a relative ( and daughter). The history is limited by the condition of the patient (patient w/ end-stage dementia).     Loly Oliveira is a 77 year old female with history of end-stage Alzheimer's dementia presenting to the ED brought in by family from [facility]. Per  and daughter patient was seen in the ED about a month ago where she was diagnosed with a UTI and treated with antibiotics. A few days later she attempted to get out of her wheelchair and fell, hitting her head. She was seen and XR was done with patient in wheelchair which they were told was inconclusive and she would likely need MRI.  They then noted that the patient began leaning her head down to the left which has been gradually worsening over the last few weeks and she is now unable to hold up her head.  Her  notes that she says \"oww\" when he lifts her head. Otherwise, she does not complain of pain. She is at her baseline mentation now. They report she may answer questions, but usually inappropriately and does not follow commands.  reports she has been more lethargic, but otherwise denies behavioral changes. She has been eating and drinking normally. Prior to the fall one month ago she was able to eat independently, but now her  has to feed her. She is also now unable to walk or even stand without assistance.  Prior to the fall she was ambulating unaided. She has had no additional falls or injuries since. Due to progressively worsening symptoms they were advised to be seen at [facility] where they were seen today and told that they were unable to manage her and she would need to be seen in the ED. Her daughter states there was concern for spinal cord injury. They deny history of prior spinal fractures.  They also deny recent " fever, chills, runny nose, congestion, cough, nausea, vomiting, or diarrhea.    Past Medical History  Past Medical History:   Diagnosis Date     Bell's palsy      Chondromalacia of patella      Dementia without behavioral disturbance, unspecified dementia type 11/19/2015     Diverticulitis of colon (without mention of hemorrhage)(562.11)      Esophageal reflux      OSTEOPENIA      Raynaud disease      RESTLESS LEGS SYNDROME     Restless Legs Syndrome     Rosacea      TINNITUS       Trochanteric bursitis 9/09    right     Past Surgical History:   Procedure Laterality Date     HC DILATION/CURETTAGE DIAG/THER NON OB  1970     HC REPAIR ROTATOR CUFF,CHRONIC       HERNIA REPAIR, UMBILICAL  1989     SALPINGO OOPHORECTOMY,R/L/CATHIE  1986     TONSILLECTOMY & ADENOIDECTOMY  1947     Z ANTER VESICOURETHROPEXY,SIMPLE  1986     [facility] ANTER VESICOURETHROPEXY,SIMPLE  1989    bergeron     [facility] NONSPECIFIC PROCEDURE  1999    right arthroscopic surgery     Z NONSPECIFIC PROCEDURE  1974    right shoulder decompression     Z NONSPECIFIC PROCEDURE  Feb 2010    Closed manipulation, left shoulder     [facility] TOTAL ABDOM HYSTERECTOMY  1986     acetaminophen (TYLENOL) 325 MG tablet  escitalopram (LEXAPRO) 10 MG tablet  levETIRAcetam (KEPPRA) 250 MG tablet  lidocaine (ASPERCREME LIDOCAINE) 4 % external cream  QUEtiapine (SEROQUEL) 25 MG tablet  QUEtiapine (SEROQUEL) 50 MG tablet  rivastigmine (EXELON) 3 MG capsule  vitamin D3 (CHOLECALCIFEROL) 50 mcg (2000 units) tablet      Allergies   Allergen Reactions     Aricept [Donepezil]      diarrhea     Family History  Family History   Problem Relation Age of Onset     Cancer Father         Lung; D: age 64     Alcohol/Drug Father         alcohol     Depression Father      C.A.D. Brother         MI at age 50; b: 1949     Diabetes Paternal Grandmother         Insulin dependent     Osteoporosis Paternal Grandmother      Cardiovascular Mother         MVR     Cerebrovascular Disease Mother       Gastrointestinal Disease Mother         diverticulitis     Osteoporosis Mother      Eye Disorder Son         Cone shaped corneas     Musculoskeletal Disorder Other         3 cousins with MD     Neurologic Disorder Other         2 cousins with spinabifida and hydrocephalic     Alcohol/Drug Son         drugs     Breast Cancer Other         cousin premenopause     Neurologic Disorder Other         Grandson  Tourettes     Social History   Social History     Tobacco Use     Smoking status: Never     Smokeless tobacco: Never   Substance Use Topics     Alcohol use: Yes     Comment: 1-2 times per year     Drug use: No         A complete review of systems was attempted but limited due to baseline dementia.    Physical Exam   BP: 104/55  Pulse: 60  Temp: 98.3  F (36.8  C)  Resp: 20  SpO2: 98 %  Physical Exam  Vitals and nursing note reviewed.   Constitutional:       General: She is not in acute distress.     Appearance: She is not diaphoretic.      Comments: Elderly female, sitting in wheelchair, hanging head down and to the left. Follows some commands though not consistently. Does not answer questions meaningfully.    HENT:      Head: Atraumatic.      Mouth/Throat:      Pharynx: No oropharyngeal exudate.   Eyes:      General: No scleral icterus.     Pupils: Pupils are equal, round, and reactive to light.   Neck:      Comments: Hanging head down and to the left. Will not volitionally raise head. Resists when neck is passively moved.   Cardiovascular:      Rate and Rhythm: Normal rate.      Pulses: Normal pulses.      Heart sounds: Normal heart sounds.   Pulmonary:      Effort: Pulmonary effort is normal. No respiratory distress.      Breath sounds: Normal breath sounds. No wheezing or rhonchi.   Abdominal:      General: Bowel sounds are normal.      Palpations: Abdomen is soft.      Tenderness: There is no abdominal tenderness.   Musculoskeletal:         General: No tenderness.      Comments: Scoliosis at baseline, hanging  head down and to the left. No focal TTP of spine on exam but exam is not reliable due to dementia.    Skin:     General: Skin is warm.      Findings: No rash.   Neurological:      Comments: Patient is awake, verbalizes some things though not meaningfully. Is able to squeeze both hands symmetrically. Can raise each leg/extend at the knee bilaterally    Cannot cooperate with CN exam.     Severe dementia at [facility].           ED Course, Procedures, & Data      Procedures               Results for orders placed or performed during the hospital encounter of 03/06/23   Cervical spine CT w/o contrast     Status: None    Narrative    EXAM: CT CERVICAL SPINE W/O CONTRAST  3/6/2023 7:06 PM     HISTORY:  hanging head down and to the left (severe dementia at  baseline), eval for fx, hanging head down and to the left, (severe  dementia at baseline), eval for fx       COMPARISON: 1/30/2023    TECHNIQUE: Using multidetector thin collimation helical acquisition  technique, axial, coronal and sagittal CT images through the cervical  spine were obtained without intravenous contrast.    FINDINGS:  Images are degraded by patient motion artifact.    There is stepwise anterolisthesis C2-C5. No acute fracture or  traumatic subluxation. No prevertebral edema.    Normal alignment. Reversal of normal cervical lordosis.    The findings on a level by level basis are as follows:    C2-3:  No spinal canal or neural foraminal stenosis.    C3-4:  No spinal canal or neural foraminal stenosis.    C4-5:  No spinal canal or neural foraminal stenosis.    C5-6:  No spinal canal or neural foraminal stenosis.    C6-7:  No spinal canal or neural foraminal stenosis.    C7-T1:  No spinal canal or neural foraminal stenosis.     No abnormality of the paraspinous soft tissues.      Impression    IMPRESSION:  1. No acute fracture or traumatic subluxation.  2. Mild stepwise anterolisthesis from C2-C5 without significant spinal  canal or neural foraminal  narrowing.    I have personally reviewed the examination and initial interpretation  and I agree with the findings.    ISELA KRUEGER MD         SYSTEM ID:  [id number]   CT Thoracic Spine w/o Contrast     Status: None    Narrative    EXAM: CT THORACIC SPINE W/O CONTRAST  3/6/2023 7:05 PM     HISTORY:  difficulty walking, eval for spinal fx       COMPARISON:  CT cervical spine 1/30/2023, CT abdomen and pelvis  2/17/2023    TECHNIQUE: Using multidetector thin collimation helical acquisition  technique, axial, sagittal and coronal 3 mm thickness CT  reconstructions were obtained through the thoracic spine without  intravenous contrast.  Images were viewed in bone and soft tissue  windows.    FINDINGS:  Normal thoracic vertebral alignment. Exaggerated dorsal kyphosis.    Unchanged chronic compression deformity of T4.     Old appearing multilevel compression deformities including old T12  superior endplate compression deformity and vertebral body height loss  of T10 and T11. Mild multilevel disc height narrowing with air within  the disc at the level of T8-T11. Patent spinal canal and neural  foramina.    Small pericardial effusion..       Impression    IMPRESSION:  1. No acute fracture or traumatic subluxation.  2. Multilevel mild chronic compression deformities. No evidence of  acute fracture.  3. Small pericardial effusion.    I have personally reviewed the examination and initial interpretation  and I agree with the findings.    ISELA KRUEGER MD         SYSTEM ID:  [id number]   Lumbar spine CT w/o contrast     Status: None    Narrative    EXAM: CT LUMBAR SPINE W/O CONTRAST  3/6/2023 7:05 PM     HISTORY:  Low back pain; Trauma and/or suspected fracture;  Mild/moderate trauma; None the following: Spondyloarthropathy, or  lumbar x-ray with questionable finding or inadequate anatomic coverage        COMPARISON:  CT abdomen and pelvis 2/17/2023    TECHNIQUE: Using multidetector thin collimation helical  acquisition  technique, axial, sagittal and coronal 3 mm thickness CT  reconstructions were obtained through the lumbar spine without  intravenous contrast.  Images were viewed in bone and soft tissue  windows.    FINDINGS:  There are 5 lumbar type vertebrae, used for the purposes of this  dictation. Normal vertebral body alignment. Mild vertebral body height  loss at L2. Left convex curvature centered at L4-5. No significant  disc space narrowing. No acute fracture. No suspicious osseous lesion.    Findings on a level by level basis are as follows:    L1-L2: No spinal canal or neural foraminal stenosis    L2-L3: No spinal canal or neural foraminal stenosis.    L3-L4: Bilateral facet hypertrophy. No spinal canal or neural  foraminal stenosis.    L4-L5: Posterior disc bulge. Bilateral facet hypertrophy. Mild  bilateral neural foraminal narrowing. No spinal canal stenosis.    L5-S1: Posterior osteophyte formation. Bilateral facet hypertrophy.  Mild left and mild to moderate right neural foraminal stenosis. No  spinal canal stenosis.    The visualized adjacent paraspinous tissues are grossly within normal  limits.      Impression    IMPRESSION:  1. No acute fracture or traumatic subluxation.  2. Spondylosis without significant spinal canal or neural foraminal  stenosis.    I have personally reviewed the examination and initial interpretation  and I agree with the findings.    ISELA KRUEGER MD         SYSTEM ID:  [id number]   Chest XR,  PA & LAT     Status: None    Narrative    EXAM: XR CHEST 2 VIEWS  3/6/2023 7:03 PM      HISTORY: occasional choking with swallowing, eval for  aspiration/pneumonia    COMPARISON: X-ray 2/17/2023    FINDINGS: Two views of the chest. Overlying head obscures lung fields.  No obvious pneumothorax though apical lungs are obscured. No pleural  effusion. Normal cardiac silhouette. No acute airspace disease.  Visualized upper abdomen and bones are grossly unremarkable.  Postsurgical changes  left humeral head.      Impression    IMPRESSION:  1. Clear chest, though overlying head obscures the biapical lung  fields. No obvious aspiration.    I have personally reviewed the examination and initial interpretation  and I agree with the findings.    CHANDLER PHILLIPS MD         SYSTEM ID:  [id number]   Comprehensive metabolic panel     Status: Abnormal   Result Value Ref Range    Sodium 140 136 - 145 mmol/L    Potassium 4.6 3.4 - 5.3 mmol/L    Chloride 104 98 - 107 mmol/L    Carbon Dioxide (CO2) 27 22 - 29 mmol/L    Anion Gap 9 7 - 15 mmol/L    Urea Nitrogen 8.3 8.0 - 23.0 mg/dL    Creatinine 0.60 0.51 - 0.95 mg/dL    Calcium 8.7 (L) 8.8 - 10.2 mg/dL    Glucose 99 70 - 99 mg/dL    Alkaline Phosphatase 94 35 - 104 U/L    AST 21 10 - 35 U/L    ALT 6 (L) 10 - 35 U/L    Protein Total 6.0 (L) 6.4 - 8.3 g/dL    Albumin 3.7 3.5 - 5.2 g/dL    Bilirubin Total 0.3 <=1.2 mg/dL    GFR Estimate >90 >60 mL/min/1.73m2   Symptomatic Influenza A/B, RSV, & SARS-CoV2 PCR (COVID-19) Nasopharyngeal     Status: Normal    Specimen: Nasopharyngeal; Swab   Result Value Ref Range    Influenza A PCR Negative Negative    Influenza B PCR Negative Negative    RSV PCR Negative Negative    SARS CoV2 PCR Negative Negative    Narrative    Testing was performed using the Xpert Xpress CoV2/Flu/RSV Assay on the [employer] GeneXpert Instrument. This test should be ordered for the detection of SARS-CoV-2, influenza, and RSV viruses in individuals who meet clinical and/or epidemiological criteria. Test performance is unknown in asymptomatic patients. This test is for in vitro diagnostic use under the FDA EUA for laboratories certified under CLIA to perform high or moderate complexity testing. This test has not been FDA cleared or approved. A negative result does not rule out the presence of PCR inhibitors in the specimen or target RNA in concentration below the limit of detection for the assay. If only one viral target is positive but coinfection with  multiple targets is suspected, the sample should be re-tested with another FDA cleared, approved, or authorized test, if coinfection would change clinical management. This test was validated by the [facility] [email or web address]. These laboratories are certified under the Clinical Laboratory Improvement Amendments of 1988 (CLIA-88) as qualified to perform high complexity laboratory testing.   CBC with platelets and differential     Status: Abnormal   Result Value Ref Range    WBC Count 7.3 4.0 - 11.0 10e3/uL    RBC Count 4.00 3.80 - 5.20 10e6/uL    Hemoglobin 13.1 11.7 - 15.7 g/dL    Hematocrit 42.1 35.0 - 47.0 %     (H) 78 - 100 fL    MCH 32.8 26.5 - 33.0 pg    MCHC 31.1 (L) 31.5 - 36.5 g/dL    RDW 12.5 10.0 - 15.0 %    Platelet Count 194 150 - 450 10e3/uL    % Neutrophils 66 %    % Lymphocytes 22 %    % Monocytes 10 %    % Eosinophils 1 %    % Basophils 1 %    % Immature Granulocytes 0 %    NRBCs per 100 WBC 0 <1 /100    Absolute Neutrophils 4.9 1.6 - 8.3 10e3/uL    Absolute Lymphocytes 1.6 0.8 - 5.3 10e3/uL    Absolute Monocytes 0.7 0.0 - 1.3 10e3/uL    Absolute Eosinophils 0.0 0.0 - 0.7 10e3/uL    Absolute Basophils 0.1 0.0 - 0.2 10e3/uL    Absolute Immature Granulocytes 0.0 <=0.4 10e3/uL    Absolute NRBCs 0.0 10e3/uL   CBC with Platelets & Differential     Status: Abnormal    Narrative    The following orders were created for panel order CBC with Platelets & Differential.  Procedure                               Abnormality         Status                     ---------                               -----------         ------                     CBC with platelets and d...[[id number]]  Abnormal            Final result                 Please view results for these tests on the individual orders.     Medications   0.9% sodium chloride BOLUS (0 mLs Intravenous Stopped 3/6/23 2050)       Cervical spine CT w/o contrast   Final Result   IMPRESSION:   1. No acute fracture or traumatic subluxation.   2. Mild  stepwise anterolisthesis from C2-C5 without significant spinal   canal or neural foraminal narrowing.      I have personally reviewed the examination and initial interpretation   and I agree with the findings.      ISELA KRUEGER MD            SYSTEM ID:  [id number]      Lumbar spine CT w/o contrast   Final Result   IMPRESSION:   1. No acute fracture or traumatic subluxation.   2. Spondylosis without significant spinal canal or neural foraminal   stenosis.      I have personally reviewed the examination and initial interpretation   and I agree with the findings.      ISELA KRUEGER MD            SYSTEM ID:  [id number]      CT Thoracic Spine w/o Contrast   Final Result   IMPRESSION:   1. No acute fracture or traumatic subluxation.   2. Multilevel mild chronic compression deformities. No evidence of   acute fracture.   3. Small pericardial effusion.      I have personally reviewed the examination and initial interpretation   and I agree with the findings.      ISELA KRUEGER MD            SYSTEM ID:  [id number]      Chest XR,  PA & LAT   Final Result   IMPRESSION:   1. Clear chest, though overlying head obscures the biapical lung   fields. No obvious aspiration.      I have personally reviewed the examination and initial interpretation   and I agree with the findings.      CHANDLER PHILLIPS MD            SYSTEM ID:  [id number]             Critical care was not performed.     Medical Decision Making  The patient's presentation was of moderate complexity (a chronic illness mild to moderate exacerbation, progression, or side effect of treatment).    The patient's evaluation involved:  ordering and/or review of 3+ test(s) in this encounter (see separate area of note for details)  review of 2 test result(s) ordered prior to this encounter (see separate area of note for details)  strong consideration of a test (see separate area of note for details) that was ultimately deferred  discussion of management or test  interpretation with another health professional (see separate area of note for details)    The patient's management necessitated only low risk treatment.      Assessment & Plan    Patient presents to the emergency department today for the above complaints.  Family is concerned that she could potentially have a spinal problem due to the fact that she is having trouble holding her head up and has stopped walking over the past month.  Certainly this would be a consideration though I think this is less likely.  Infection would be possible, metabolic derangement would also be possible.  However, I have more suspicion that her end-stage dementia is likely what is causing her symptoms at this time.    We did establish IV access and we did draw blood for laboratory analysis.  CBC shows WBC of 7.3, hemoglobin is 13.1, normal platelet count. CMP shows normal electrolytes, normal creatinine.  LFTs appear to be within normal limits. Influenza A is negative, influenza B is negative, SARS Co-V2 PCR swab is is negative.     Chest x-ray was read by radiology as clear chest though overlying had obscured by apical lung fields but no sign of aspiration.  C-spine CT shows no acute fracture or subluxation.  There is mild stepwise anterolisthesis from C2-C5 without significant spinal canal or neuroforaminal narrowing.  T-spine CT shows no acute fracture or traumatic subluxation.  There are multilevel mild chronic compression deformities without evidence of acute fracture.  L-spine CT shows no acute fracture or traumatic subluxation, there is spondylolysis without significant spinal canal or neuroforaminal stenosis.    Family wanted to see a spinal specialist as they were told at [facility] that she could have a spinal cord issue.  I did believe that it was very unlikely that there would be an acute issue that neurosurgery would do anything about urgently, but they were kind enough to see the patient in the ED.  They have no  recommendations at this time.  Family keeps asking about a collar for her neck, however given the fact that there is no sign of fracture, this would only cause more problems.    I did offer to check a urinalysis on the patient given the fact that she had a recent UTI and this could possibly explain some of her symptoms currently.  The patient's family are not willing to wait at this time and do not want a urine checked.    I have reviewed the nursing notes. I have reviewed the findings, diagnosis, plan and need for follow up with the patient.    New Prescriptions    No medications on file       Final diagnoses:   Trouble walking   Compression fracture of body of thoracic vertebra (H) - multiple chronic thoracic compression fractures   Neck problem - holding neck down and to the left   Severe Alzheimer's dementia, unspecified timing of dementia onset, unspecified whether behavioral, psychotic, or mood disturbance or anxiety (H)   I, Magui Bang, am serving as a trained medical scribe to document services personally performed by Beatriz Dailey MD, based on the provider's statements to me.     I, Beatriz Dailey MD, was physically present and have reviewed and verified the accuracy of this note documented by Magui Bang.      Beatriz Dailey MD  [facility] EMERGENCY DEPARTMENT  3/6/2023     Beatriz Dailey MD  03/06/23 1290

## 2023-03-06 NOTE — ED TRIAGE NOTES
Fell about a month and obtained concerned about thoracic compression fxs. Was sent from memory care center as pt is hanging her head and not walking. Has had ongoing decline in mobility, providers told family that they were concerned about spinal cord? Pt is at baseline and has AMS.

## 2023-03-07 NOTE — CONSULTS
[facility]       NEUROSURGERY CONSULTATION NOTE    This consultation was requested by Dr. Dailey from the Emergency service.    Reason for Consultation: progressive cervical kyphosis; recommendations for family    HPI: Loly Oliveira is a 77 year old female with severe dementia who was brought in from memory care unit by her family for evaluation of progressive bending forward of her head. Neurosurgery was consulted for recommendations for collar vs follow up.    Her daughter provides history as patient cannot participate accurately with a conversation or exam. Daughter states that patient had a UTI approximately 3 weeks ago and since then started bending her head forward more and tilting it toward the left. She is unable to raise it, and when this is done passively she endorses pain and resists. Daughter has questions about whether this is safe or if there is anything to do. She denies noticing any new weakness or deficit in her mother other than the head tilt.       PAST MEDICAL HISTORY:   Past Medical History:   Diagnosis Date     Bell's palsy      Chondromalacia of patella      Dementia without behavioral disturbance, unspecified dementia type 11/19/2015     Diverticulitis of colon (without mention of hemorrhage)(562.11)      Esophageal reflux      OSTEOPENIA      Raynaud disease      RESTLESS LEGS SYNDROME     Restless Legs Syndrome     Rosacea      TINNITUS       Trochanteric bursitis 9/09    right       PAST SURGICAL HISTORY:   Past Surgical History:   Procedure Laterality Date     HC DILATION/CURETTAGE DIAG/THER NON OB  1970     HC REPAIR ROTATOR CUFF,CHRONIC       HERNIA REPAIR, UMBILICAL  1989     SALPINGO OOPHORECTOMY,R/L/CATHIE  1986     TONSILLECTOMY & ADENOIDECTOMY  1947     ZZC ANTER VESICOURETHROPEXY,SIMPLE  1986     ZZC ANTER VESICOURETHROPEXY,SIMPLE  1989    bergeron     ZZC NONSPECIFIC PROCEDURE  1999    right arthroscopic surgery     ZZC NONSPECIFIC  PROCEDURE  1974    right shoulder decompression     ZZC NONSPECIFIC PROCEDURE  Feb 2010    Closed manipulation, left shoulder     ZZC TOTAL ABDOM HYSTERECTOMY  1986       FAMILY HISTORY:   Family History   Problem Relation Age of Onset     Cancer Father         Lung; D: age 64     Alcohol/Drug Father         alcohol     Depression Father      C.A.D. Brother         MI at age 50; b: 1949     Diabetes Paternal Grandmother         Insulin dependent     Osteoporosis Paternal Grandmother      Cardiovascular Mother         MVR     Cerebrovascular Disease Mother      Gastrointestinal Disease Mother         diverticulitis     Osteoporosis Mother      Eye Disorder Son         Cone shaped corneas     Musculoskeletal Disorder Other         3 cousins with MD     Neurologic Disorder Other         2 cousins with spinabifida and hydrocephalic     Alcohol/Drug Son         drugs     Breast Cancer Other         cousin premenopause     Neurologic Disorder Other         Grandson  Tourettes       SOCIAL HISTORY:   Social History     Tobacco Use     Smoking status: Never     Smokeless tobacco: Never   Substance Use Topics     Alcohol use: Yes     Comment: 1-2 times per year       MEDICATIONS:  (Not in a hospital admission)      Allergies:  Allergies   Allergen Reactions     Aricept [Donepezil]      diarrhea       ROS: 10 point ROS were all negative except for pertinent positives noted in my HPI.    Physical exam:   Blood pressure 104/55, pulse 60, temperature 98.3  F (36.8  C), temperature source Oral, resp. rate 20, SpO2 98 %, not currently breastfeeding.  CV: HR and BP as noted above  PULM: breathing comfortably on room air  ABD: soft, non-distended  MSK: no midline or point tenderness; endorses pain when neck is extended  NEUROLOGIC:  -- Awake; Alert; oriented to name, not to place, time, or surroundings  -- PERRL, EOMI, face symmetric  Motor:  Normal bulk / tone; no tremor, rigidity, or bradykinesia.  No muscle wasting or  fasciculations    Patient with poor participation in exam but grossly symmetric and strength 4- throughout, age-appropriate    Sensation intact throughout    Gait: Deferred      LABS:  Recent Labs   Lab 03/06/23  1854      POTASSIUM 4.6   CHLORIDE 104   CO2 27   ANIONGAP 9   GLC 99   BUN 8.3   CR 0.60   LAURENCE 8.7*       Recent Labs   Lab 03/06/23  1854   WBC 7.3   RBC 4.00   HGB 13.1   HCT 42.1   *   MCH 32.8   MCHC 31.1*   RDW 12.5          IMAGING:  CT Cervical   IMPRESSION:  1. No acute fracture or traumatic subluxation.  2. Mild stepwise anterolisthesis from C2-C5 without significant spinal  canal or neural foraminal narrowing.    CT Thoracic:  IMPRESSION:  1. No acute fracture or traumatic subluxation.  2. Multilevel mild chronic compression deformities. No evidence of  acute fracture.  3. Small pericardial effusion.    CT Lumbar:  IMPRESSION:  1. No acute fracture or traumatic subluxation.  2. Spondylosis without significant spinal canal or neural foraminal  stenosis.      ASSESSMENT:  Ms. Oliveira is a 76 yo female with history of dementia who presents to ED from memory care unit with family concerned for her progressive kyphosis with no acute findings on CT C/T/L spine and no focal neurological deficit.    Clinically Significant Risk Factors Present on Admission                    # Dementia: noted on problem list         RECOMMENDATIONS:  No neurosurgical intervention indicated at this time   No further imaging  No recommendations for a collar  Ensure patient has good hygiene and assist with eating      The patient was discussed with Dr. Arias, neurosurgery chief resident, and Dr. Ludwig, neurosurgery staff, and they agree with the above.    Gus Huynh MD, PhD  PGY-2 Neurosurgery  Please page NSGY on call with questions

## 2023-03-07 NOTE — ED NOTES
Pt discharged in personal wheelchair with [facility]. Attempted to call facility report no answer. Family members at bedside made aware and will update facility extra copy of discharge paperwork given to family. All questions addressed. Left with all personal belongings.

## 2023-03-07 NOTE — DISCHARGE INSTRUCTIONS
You have been seen in the emergency department today for your symptoms.  Your testing today does not show any acute problems.  You do not have any sign of infection, your influenza and COVID swabs were negative.  Your blood work looks good.  Your family did not want us to check a urinalysis on you, so we suggest that you have this done from your [facility].  Your CT scans do not show any acute fractures or problems with broken bones.  You have seen the neurosurgery team and they did not recommend a collar for the neck as this would only cause more problems.    Follow-up with your primary clinic for any ongoing issues.